# Patient Record
Sex: FEMALE | Race: ASIAN | NOT HISPANIC OR LATINO
[De-identification: names, ages, dates, MRNs, and addresses within clinical notes are randomized per-mention and may not be internally consistent; named-entity substitution may affect disease eponyms.]

---

## 2018-07-18 ENCOUNTER — APPOINTMENT (OUTPATIENT)
Dept: OBGYN | Facility: CLINIC | Age: 38
End: 2018-07-18
Payer: COMMERCIAL

## 2018-07-18 VITALS
HEIGHT: 70 IN | DIASTOLIC BLOOD PRESSURE: 77 MMHG | WEIGHT: 212 LBS | BODY MASS INDEX: 30.35 KG/M2 | SYSTOLIC BLOOD PRESSURE: 109 MMHG

## 2018-07-18 PROCEDURE — 99395 PREV VISIT EST AGE 18-39: CPT

## 2018-07-20 LAB — HPV HIGH+LOW RISK DNA PNL CVX: NOT DETECTED

## 2018-07-28 LAB — CYTOLOGY CVX/VAG DOC THIN PREP: NORMAL

## 2019-03-15 ENCOUNTER — APPOINTMENT (OUTPATIENT)
Dept: OTOLARYNGOLOGY | Facility: CLINIC | Age: 39
End: 2019-03-15
Payer: COMMERCIAL

## 2019-03-15 VITALS
SYSTOLIC BLOOD PRESSURE: 122 MMHG | BODY MASS INDEX: 30.35 KG/M2 | DIASTOLIC BLOOD PRESSURE: 78 MMHG | HEART RATE: 76 BPM | HEIGHT: 70 IN | WEIGHT: 212 LBS

## 2019-03-15 DIAGNOSIS — R68.89 OTHER GENERAL SYMPTOMS AND SIGNS: ICD-10-CM

## 2019-03-15 DIAGNOSIS — F45.8 OTHER SOMATOFORM DISORDERS: ICD-10-CM

## 2019-03-15 DIAGNOSIS — K21.9 GASTRO-ESOPHAGEAL REFLUX DISEASE W/OUT ESOPHAGITIS: ICD-10-CM

## 2019-03-15 PROCEDURE — 99244 OFF/OP CNSLTJ NEW/EST MOD 40: CPT | Mod: 25

## 2019-03-15 PROCEDURE — 31575 DIAGNOSTIC LARYNGOSCOPY: CPT

## 2019-03-15 RX ORDER — FLUTICASONE FUROATE 27.5 UG/1
27.5 SPRAY, METERED NASAL
Refills: 0 | Status: ACTIVE | COMMUNITY

## 2019-03-15 NOTE — PROCEDURE
[de-identified] : Afrin 0.05% and lidocaine 4% sprayed into both nasal passages. Flexible scope #2 used. Passed through nasal passage and nasopharynx/oropharynx/hypopharynx clear. Supraglottis normal. Glottis with fully mobile vocal cords without lesions or masses. Visualized subglottis clear. Postcricoid area without erythema or edema. No pooling of secretions.

## 2019-03-15 NOTE — ASSESSMENT
[FreeTextEntry1] : throat clearing, resolved:\par - suspect was LPRD\par - given reflux precautions handout\par - return for re-eval if symptoms recur

## 2019-03-15 NOTE — CONSULT LETTER
[Dear  ___] : Dear  [unfilled], [Courtesy Letter:] : I had the pleasure of seeing your patient, [unfilled], in my office today. [Please see my note below.] : Please see my note below. [Consult Closing:] : Thank you very much for allowing me to participate in the care of this patient.  If you have any questions, please do not hesitate to contact me. [Sincerely,] : Sincerely, [FreeTextEntry3] : Cindi Gamez MD\par Otolaryngology and Cranial Base Surgery\par Attending Physician - Department of Otolaryngology and Head & Neck Surgery\par [facility]\par  - [employer] [facility]\par Office: [phone]\par Fax: [phone]\par

## 2019-03-15 NOTE — HISTORY OF PRESENT ILLNESS
[de-identified] : 39 y/o F, 5 months ago had intermittent throat clearing with raspy voice and globus sensation.  Symptoms were worse in the morning.  No trouble eating or drinking.  No SOB.  Tried Pepcid for one week without improvement.  Notes symptoms resolved on their own 2 months. ago.

## 2020-04-25 ENCOUNTER — MESSAGE (OUTPATIENT)
Age: 40
End: 2020-04-25

## 2020-05-07 ENCOUNTER — APPOINTMENT (OUTPATIENT)
Dept: DISASTER EMERGENCY | Facility: CLINIC | Age: 40
End: 2020-05-07

## 2020-05-07 LAB
SARS-COV-2 IGG SERPL IA-ACNC: 0.3 INDEX
SARS-COV-2 IGG SERPL QL IA: NEGATIVE

## 2020-06-03 ENCOUNTER — APPOINTMENT (OUTPATIENT)
Dept: OTOLARYNGOLOGY | Facility: CLINIC | Age: 40
End: 2020-06-03

## 2020-12-24 ENCOUNTER — LABORATORY RESULT (OUTPATIENT)
Age: 40
End: 2020-12-24

## 2020-12-24 DIAGNOSIS — Z00.00 ENCOUNTER FOR GENERAL ADULT MEDICAL EXAMINATION W/OUT ABNORMAL FINDINGS: ICD-10-CM

## 2021-02-10 DIAGNOSIS — L30.9 DERMATITIS, UNSPECIFIED: ICD-10-CM

## 2021-02-10 RX ORDER — TRIAMCINOLONE ACETONIDE 1 MG/G
0.1 OINTMENT TOPICAL TWICE DAILY
Qty: 1 | Refills: 0 | Status: ACTIVE | COMMUNITY
Start: 2021-02-10 | End: 1900-01-01

## 2021-02-24 ENCOUNTER — APPOINTMENT (OUTPATIENT)
Dept: HEMATOLOGY ONCOLOGY | Facility: CLINIC | Age: 41
End: 2021-02-24

## 2021-02-25 ENCOUNTER — OUTPATIENT (OUTPATIENT)
Dept: OUTPATIENT SERVICES | Facility: HOSPITAL | Age: 41
LOS: 1 days | Discharge: HOME | End: 2021-02-25

## 2021-02-25 ENCOUNTER — APPOINTMENT (OUTPATIENT)
Dept: HEMATOLOGY ONCOLOGY | Facility: CLINIC | Age: 41
End: 2021-02-25

## 2021-02-25 ENCOUNTER — LABORATORY RESULT (OUTPATIENT)
Age: 41
End: 2021-02-25

## 2021-02-25 DIAGNOSIS — U07.1 COVID-19: ICD-10-CM

## 2021-02-26 ENCOUNTER — APPOINTMENT (OUTPATIENT)
Dept: HEMATOLOGY ONCOLOGY | Facility: CLINIC | Age: 41
End: 2021-02-26

## 2022-02-11 ENCOUNTER — APPOINTMENT (OUTPATIENT)
Dept: MAMMOGRAPHY | Facility: IMAGING CENTER | Age: 42
End: 2022-02-11
Payer: COMMERCIAL

## 2022-02-11 ENCOUNTER — APPOINTMENT (OUTPATIENT)
Dept: ULTRASOUND IMAGING | Facility: IMAGING CENTER | Age: 42
End: 2022-02-11
Payer: COMMERCIAL

## 2022-02-11 ENCOUNTER — OUTPATIENT (OUTPATIENT)
Dept: OUTPATIENT SERVICES | Facility: HOSPITAL | Age: 42
LOS: 1 days | End: 2022-02-11
Payer: COMMERCIAL

## 2022-02-11 DIAGNOSIS — Z00.8 ENCOUNTER FOR OTHER GENERAL EXAMINATION: ICD-10-CM

## 2022-02-11 PROCEDURE — 77063 BREAST TOMOSYNTHESIS BI: CPT

## 2022-02-11 PROCEDURE — 77067 SCR MAMMO BI INCL CAD: CPT | Mod: 26

## 2022-02-11 PROCEDURE — 77067 SCR MAMMO BI INCL CAD: CPT

## 2022-02-11 PROCEDURE — 77063 BREAST TOMOSYNTHESIS BI: CPT | Mod: 26

## 2022-02-11 PROCEDURE — 76641 ULTRASOUND BREAST COMPLETE: CPT | Mod: 26,50

## 2022-02-11 PROCEDURE — 76641 ULTRASOUND BREAST COMPLETE: CPT

## 2023-05-08 ENCOUNTER — APPOINTMENT (OUTPATIENT)
Dept: OBGYN | Facility: CLINIC | Age: 43
End: 2023-05-08
Payer: COMMERCIAL

## 2023-05-08 VITALS
HEIGHT: 70 IN | HEART RATE: 80 BPM | WEIGHT: 231.6 LBS | SYSTOLIC BLOOD PRESSURE: 131 MMHG | BODY MASS INDEX: 33.16 KG/M2 | DIASTOLIC BLOOD PRESSURE: 84 MMHG

## 2023-05-08 PROCEDURE — 99386 PREV VISIT NEW AGE 40-64: CPT

## 2023-05-08 NOTE — PHYSICAL EXAM
[Chaperone Present] : A chaperone was present in the examining room during all aspects of the physical examination [FreeTextEntry1] : DANIEL Almendarez [Appropriately responsive] : appropriately responsive [Alert] : alert [No Acute Distress] : no acute distress [No Lymphadenopathy] : no lymphadenopathy [Regular Rate Rhythm] : regular rate rhythm [No Murmurs] : no murmurs [Clear to Auscultation B/L] : clear to auscultation bilaterally [Soft] : soft [Non-tender] : non-tender [Non-distended] : non-distended [No HSM] : No HSM [No Lesions] : no lesions [No Mass] : no mass [Oriented x3] : oriented x3 [Examination Of The Breasts] : a normal appearance [Breast Palpation Diffuse Fibrous Tissue Bilateral] : fibrocystic changes [No Masses] : no breast masses were palpable [Labia Minora] : normal [Labia Majora] : normal [Scant] : There was scant vaginal bleeding [Normal] : normal [Tenderness] : nontender [Enlarged ___ wks] : not enlarged [Uterine Adnexae] : normal

## 2023-05-08 NOTE — PLAN
[FreeTextEntry1] : 43 y/o presents for annual visit. \par \par #HM\par -Pap with HPV today\par -Mammo scheduled\par \par #Heavy menstrual bleeding\par -Requisition for pelvic sono provided \par \par winsome HAM\par

## 2023-05-17 LAB
CYTOLOGY CVX/VAG DOC THIN PREP: ABNORMAL
HPV HIGH+LOW RISK DNA PNL CVX: DETECTED

## 2023-05-19 ENCOUNTER — TRANSCRIPTION ENCOUNTER (OUTPATIENT)
Age: 43
End: 2023-05-19

## 2023-05-19 ENCOUNTER — APPOINTMENT (OUTPATIENT)
Dept: OBGYN | Facility: CLINIC | Age: 43
End: 2023-05-19
Payer: COMMERCIAL

## 2023-05-19 VITALS
WEIGHT: 225 LBS | SYSTOLIC BLOOD PRESSURE: 123 MMHG | HEART RATE: 72 BPM | DIASTOLIC BLOOD PRESSURE: 83 MMHG | HEIGHT: 70 IN | BODY MASS INDEX: 32.21 KG/M2

## 2023-05-19 DIAGNOSIS — R87.615 UNSATISFACTORY CYTOLOGIC SMEAR OF CERVIX: ICD-10-CM

## 2023-05-19 DIAGNOSIS — Z01.419 ENCOUNTER FOR GYNECOLOGICAL EXAMINATION (GENERAL) (ROUTINE) W/OUT ABNORMAL FINDINGS: ICD-10-CM

## 2023-05-19 PROCEDURE — 99213 OFFICE O/P EST LOW 20 MIN: CPT

## 2023-05-19 NOTE — PLAN
[FreeTextEntry1] : 41y/o presents for repeat pap smear\par \par #HM\par -Pap smear performed today\par -Rx Provera sent to delay period until after vacation\par \par winsome HAM\par

## 2023-05-19 NOTE — PHYSICAL EXAM
[Chaperone Present] : A chaperone was present in the examining room during all aspects of the physical examination [FreeTextEntry1] : DANIEL Young [Appropriately responsive] : appropriately responsive [Alert] : alert [No Acute Distress] : no acute distress [No Lymphadenopathy] : no lymphadenopathy [Regular Rate Rhythm] : regular rate rhythm [No Murmurs] : no murmurs [Clear to Auscultation B/L] : clear to auscultation bilaterally [Soft] : soft [Non-tender] : non-tender [Non-distended] : non-distended [No HSM] : No HSM [No Lesions] : no lesions [No Mass] : no mass [Oriented x3] : oriented x3 [Labia Majora] : normal [Labia Minora] : normal [No Bleeding] : There was no active vaginal bleeding [Normal] : normal [Uterine Adnexae] : normal

## 2023-05-21 LAB — HPV HIGH+LOW RISK DNA PNL CVX: NOT DETECTED

## 2023-06-02 LAB — CYTOLOGY CVX/VAG DOC THIN PREP: NORMAL

## 2023-06-14 ENCOUNTER — APPOINTMENT (OUTPATIENT)
Dept: ULTRASOUND IMAGING | Facility: IMAGING CENTER | Age: 43
End: 2023-06-14
Payer: COMMERCIAL

## 2023-06-14 ENCOUNTER — APPOINTMENT (OUTPATIENT)
Dept: MAMMOGRAPHY | Facility: IMAGING CENTER | Age: 43
End: 2023-06-14
Payer: COMMERCIAL

## 2023-06-14 ENCOUNTER — OUTPATIENT (OUTPATIENT)
Dept: OUTPATIENT SERVICES | Facility: HOSPITAL | Age: 43
LOS: 1 days | End: 2023-06-14
Payer: COMMERCIAL

## 2023-06-14 DIAGNOSIS — Z01.419 ENCOUNTER FOR GYNECOLOGICAL EXAMINATION (GENERAL) (ROUTINE) WITHOUT ABNORMAL FINDINGS: ICD-10-CM

## 2023-06-14 PROCEDURE — 76830 TRANSVAGINAL US NON-OB: CPT | Mod: 26

## 2023-06-14 PROCEDURE — 77067 SCR MAMMO BI INCL CAD: CPT | Mod: 26

## 2023-06-14 PROCEDURE — 76830 TRANSVAGINAL US NON-OB: CPT

## 2023-06-14 PROCEDURE — 76856 US EXAM PELVIC COMPLETE: CPT

## 2023-06-14 PROCEDURE — 76856 US EXAM PELVIC COMPLETE: CPT | Mod: 26,59

## 2023-06-14 PROCEDURE — 77067 SCR MAMMO BI INCL CAD: CPT

## 2023-06-14 PROCEDURE — 77063 BREAST TOMOSYNTHESIS BI: CPT | Mod: 26

## 2023-06-14 PROCEDURE — 76856 US EXAM PELVIC COMPLETE: CPT | Mod: 26

## 2023-06-14 PROCEDURE — 77063 BREAST TOMOSYNTHESIS BI: CPT

## 2023-06-28 ENCOUNTER — APPOINTMENT (OUTPATIENT)
Dept: OBGYN | Facility: CLINIC | Age: 43
End: 2023-06-28
Payer: COMMERCIAL

## 2023-06-28 DIAGNOSIS — D25.9 LEIOMYOMA OF UTERUS, UNSPECIFIED: ICD-10-CM

## 2023-06-28 PROCEDURE — 99213 OFFICE O/P EST LOW 20 MIN: CPT | Mod: 95

## 2023-06-28 NOTE — PLAN
[FreeTextEntry1] : 43y/o presents for followup regarding heavy menstrual bleeding. Pt with myomatous, enlarged uterus on sonogram.\par \par #Heavy menstrual bleeding\par -2/2 to fibroids\par -Bleeding continues to get heavier, now with clot passage. Pt desires treatment\par -Discussed treatment options: medical tx with progesterone (oral vs Mirena IUD), UAE, surgical tx with hysterectomy\par -Pt desires trial of medical management prior to considering definitive treatment with surgical management. Pt desires Mirena IUD placement\par -Billing team tasked for authorization\par -Will plan for placement under ultrasound guidance due to myomatous uterus \par \par RTO for IUD placement\par winsome HAM

## 2023-07-12 ENCOUNTER — TRANSCRIPTION ENCOUNTER (OUTPATIENT)
Age: 43
End: 2023-07-12

## 2023-07-12 RX ORDER — MEDROXYPROGESTERONE ACETATE 10 MG/1
10 TABLET ORAL DAILY
Qty: 30 | Refills: 1 | Status: ACTIVE | COMMUNITY
Start: 2023-05-19 | End: 1900-01-01

## 2023-08-15 ENCOUNTER — APPOINTMENT (OUTPATIENT)
Dept: OBGYN | Facility: CLINIC | Age: 43
End: 2023-08-15

## 2024-10-25 ENCOUNTER — OUTPATIENT (OUTPATIENT)
Dept: OUTPATIENT SERVICES | Facility: HOSPITAL | Age: 44
LOS: 1 days | End: 2024-10-25
Payer: COMMERCIAL

## 2024-10-25 VITALS
HEART RATE: 84 BPM | OXYGEN SATURATION: 98 % | DIASTOLIC BLOOD PRESSURE: 88 MMHG | SYSTOLIC BLOOD PRESSURE: 128 MMHG | TEMPERATURE: 99 F | RESPIRATION RATE: 18 BRPM | HEIGHT: 70 IN | WEIGHT: 220.02 LBS

## 2024-10-25 DIAGNOSIS — D25.9 LEIOMYOMA OF UTERUS, UNSPECIFIED: ICD-10-CM

## 2024-10-25 LAB
ANION GAP SERPL CALC-SCNC: 14 MMOL/L — SIGNIFICANT CHANGE UP (ref 5–17)
BLD GP AB SCN SERPL QL: NEGATIVE — SIGNIFICANT CHANGE UP
BUN SERPL-MCNC: 11 MG/DL — SIGNIFICANT CHANGE UP (ref 7–23)
CALCIUM SERPL-MCNC: 9.5 MG/DL — SIGNIFICANT CHANGE UP (ref 8.4–10.5)
CHLORIDE SERPL-SCNC: 101 MMOL/L — SIGNIFICANT CHANGE UP (ref 96–108)
CO2 SERPL-SCNC: 22 MMOL/L — SIGNIFICANT CHANGE UP (ref 22–31)
CREAT SERPL-MCNC: 0.65 MG/DL — SIGNIFICANT CHANGE UP (ref 0.5–1.3)
EGFR: 111 ML/MIN/1.73M2 — SIGNIFICANT CHANGE UP
GLUCOSE SERPL-MCNC: 87 MG/DL — SIGNIFICANT CHANGE UP (ref 70–99)
HCT VFR BLD CALC: 30.2 % — LOW (ref 34.5–45)
HGB BLD-MCNC: 8.7 G/DL — LOW (ref 11.5–15.5)
MCHC RBC-ENTMCNC: 20 PG — LOW (ref 27–34)
MCHC RBC-ENTMCNC: 28.8 GM/DL — LOW (ref 32–36)
MCV RBC AUTO: 69.4 FL — LOW (ref 80–100)
NRBC # BLD: 0 /100 WBCS — SIGNIFICANT CHANGE UP (ref 0–0)
PLATELET # BLD AUTO: 480 K/UL — HIGH (ref 150–400)
POTASSIUM SERPL-MCNC: 4.2 MMOL/L — SIGNIFICANT CHANGE UP (ref 3.5–5.3)
POTASSIUM SERPL-SCNC: 4.2 MMOL/L — SIGNIFICANT CHANGE UP (ref 3.5–5.3)
RBC # BLD: 4.35 M/UL — SIGNIFICANT CHANGE UP (ref 3.8–5.2)
RBC # FLD: 16.7 % — HIGH (ref 10.3–14.5)
RH IG SCN BLD-IMP: POSITIVE — SIGNIFICANT CHANGE UP
SODIUM SERPL-SCNC: 137 MMOL/L — SIGNIFICANT CHANGE UP (ref 135–145)
WBC # BLD: 8.83 K/UL — SIGNIFICANT CHANGE UP (ref 3.8–10.5)
WBC # FLD AUTO: 8.83 K/UL — SIGNIFICANT CHANGE UP (ref 3.8–10.5)

## 2024-10-25 PROCEDURE — G0463: CPT

## 2024-10-25 PROCEDURE — 85027 COMPLETE CBC AUTOMATED: CPT

## 2024-10-25 PROCEDURE — 86901 BLOOD TYPING SEROLOGIC RH(D): CPT

## 2024-10-25 PROCEDURE — 80048 BASIC METABOLIC PNL TOTAL CA: CPT

## 2024-10-25 PROCEDURE — 86900 BLOOD TYPING SEROLOGIC ABO: CPT

## 2024-10-25 PROCEDURE — 86850 RBC ANTIBODY SCREEN: CPT

## 2024-10-25 NOTE — H&P PST ADULT - HISTORY OF PRESENT ILLNESS
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 No significant PMH. Denies recent fevers, chills, cough, chest pain or SOB and feels well overall.

## 2024-10-25 NOTE — H&P PST ADULT - TEMPERATURE IN FAHRENHEIT (DEGREES F)
You can access the FollowMyHealth Patient Portal offered by [facility] by registering at the following website: [email or web address]. By joining [facility]’s FollowMyHealth portal, you will also be able to view your health information using other applications (apps) compatible with our system.
98.7

## 2024-10-25 NOTE — H&P PST ADULT - PROBLEM SELECTOR PLAN 1
Scheduled for sx on 11/1/2024. Surgical instructions reviewed and provided to pt. On Zepbound, last dose 10/18/2024. Asymptomatic, denies any GI symptoms.

## 2024-10-25 NOTE — H&P PST ADULT - NSICDXPASTMEDICALHX_GEN_ALL_CORE_FT
PAST MEDICAL HISTORY:  Abnormal uterine and vaginal bleeding, unspecified     Leiomyoma of uterus, unspecified      PAST MEDICAL HISTORY:  Abnormal uterine and vaginal bleeding, unspecified     BMI 31.0-31.9,adult     Leiomyoma of uterus, unspecified

## 2024-10-25 NOTE — H&P PST ADULT - ATTENDING COMMENTS
Gyn Attg Note:   Pt was counseled today at the preop area, re the scheduled gyn operation: D+C, Operative Hysteroscopy, Resection of submucosal fibroid or any other intrauterine lesion, Placement of Mirena IUD.   Discussed and reviewed indication for surgery, possible risks and operative complications, including but not limited to postop infection, excessive bleeding, blood transfusion, injuries to the gyn organs or the surrounding organs and tissues such as the urologic organs or the GI tract.   Postop care, recovery, return to nl activities, pain mgmt also discussed.  Possible side effects of hormonal IUD also explained.   All q's answered.  Consent signed by pt and witnessed.

## 2024-11-01 ENCOUNTER — TRANSCRIPTION ENCOUNTER (OUTPATIENT)
Age: 44
End: 2024-11-01

## 2024-11-01 ENCOUNTER — OUTPATIENT (OUTPATIENT)
Dept: OUTPATIENT SERVICES | Facility: HOSPITAL | Age: 44
LOS: 1 days | End: 2024-11-01
Payer: COMMERCIAL

## 2024-11-01 VITALS
OXYGEN SATURATION: 99 % | SYSTOLIC BLOOD PRESSURE: 132 MMHG | DIASTOLIC BLOOD PRESSURE: 83 MMHG | TEMPERATURE: 98 F | HEART RATE: 87 BPM | RESPIRATION RATE: 17 BRPM

## 2024-11-01 VITALS
SYSTOLIC BLOOD PRESSURE: 110 MMHG | OXYGEN SATURATION: 98 % | HEART RATE: 76 BPM | HEIGHT: 70 IN | DIASTOLIC BLOOD PRESSURE: 76 MMHG | WEIGHT: 220.02 LBS | RESPIRATION RATE: 16 BRPM | TEMPERATURE: 97 F

## 2024-11-01 DIAGNOSIS — D25.9 LEIOMYOMA OF UTERUS, UNSPECIFIED: ICD-10-CM

## 2024-11-01 PROCEDURE — 58300 INSERT INTRAUTERINE DEVICE: CPT

## 2024-11-01 PROCEDURE — 86900 BLOOD TYPING SEROLOGIC ABO: CPT

## 2024-11-01 PROCEDURE — 86850 RBC ANTIBODY SCREEN: CPT

## 2024-11-01 PROCEDURE — 88305 TISSUE EXAM BY PATHOLOGIST: CPT

## 2024-11-01 PROCEDURE — C1782: CPT

## 2024-11-01 PROCEDURE — 58558 HYSTEROSCOPY BIOPSY: CPT

## 2024-11-01 PROCEDURE — 86901 BLOOD TYPING SEROLOGIC RH(D): CPT

## 2024-11-01 PROCEDURE — 88305 TISSUE EXAM BY PATHOLOGIST: CPT | Mod: 26

## 2024-11-01 DEVICE — AVETA WAVE PLUS RESECTING DEVICE 3.9MM: Type: IMPLANTABLE DEVICE | Status: FUNCTIONAL

## 2024-11-01 DEVICE — IUD MIRENA: Type: IMPLANTABLE DEVICE | Status: FUNCTIONAL

## 2024-11-01 RX ORDER — B-COMPLEX WITH VITAMIN C
1 VIAL (ML) INJECTION
Refills: 0 | DISCHARGE

## 2024-11-01 RX ORDER — FERROUS SULFATE 325(65) MG
1 TABLET ORAL
Refills: 0 | DISCHARGE

## 2024-11-01 RX ORDER — ACETAMINOPHEN 500 MG
2 TABLET ORAL
Refills: 0 | DISCHARGE

## 2024-11-01 RX ORDER — OXYCODONE HYDROCHLORIDE 30 MG/1
5 TABLET ORAL ONCE
Refills: 0 | Status: DISCONTINUED | OUTPATIENT
Start: 2024-11-01 | End: 2024-11-01

## 2024-11-01 RX ORDER — ONDANSETRON HYDROCHLORIDE 2 MG/ML
4 INJECTION, SOLUTION INTRAMUSCULAR; INTRAVENOUS ONCE
Refills: 0 | Status: ACTIVE | OUTPATIENT
Start: 2024-11-01 | End: 2025-09-30

## 2024-11-01 RX ORDER — LIDOCAINE HCL 60 MG/3 ML
0.2 SYRINGE (ML) INJECTION ONCE
Refills: 0 | Status: ACTIVE | OUTPATIENT
Start: 2024-11-01

## 2024-11-01 RX ORDER — FENTANYL CITRAT/DEXTROSE 5%/PF 1250MCG/50
25 PATIENT CONTROLLED ANALGESIA SYRINGE INTRAVENOUS
Refills: 0 | Status: DISCONTINUED | OUTPATIENT
Start: 2024-11-01 | End: 2024-11-01

## 2024-11-01 RX ORDER — IBUPROFEN 200 MG
4 TABLET ORAL
Refills: 0 | DISCHARGE

## 2024-11-01 RX ADMIN — Medication 100 MILLILITER(S): at 11:00

## 2024-11-01 NOTE — ASU DISCHARGE PLAN (ADULT/PEDIATRIC) - FINANCIAL ASSISTANCE
[facility] provides services at a reduced cost to those who are determined to be eligible through [facility]’s financial assistance program. Information regarding [facility]’s financial assistance program can be found by going to [email or web address] or by calling [phone].

## 2024-11-01 NOTE — ASU DISCHARGE PLAN (ADULT/PEDIATRIC) - NS MD DC FALL RISK RISK
For information on Fall & Injury Prevention, visit: [email or web address] OR  [email or web address] OR  https://www.cdc.gov/steadi/patient.html

## 2024-11-01 NOTE — BRIEF OPERATIVE NOTE - OPERATION/FINDINGS
multi-nodular fibroid uterus.  Overall size of uterus 14-16 wk size.  uterine cavity containing a 4-5 cm submucosal myoma arising from the posterior / L lateral aspect.  Both Fallopian tube ostia were normal.  cervix and vaginal canal and vulva: normal.   no adnexal / pelvic mass palpable.

## 2024-11-01 NOTE — BRIEF OPERATIVE NOTE - NSICDXBRIEFPREOP_GEN_ALL_CORE_FT
PRE-OP DIAGNOSIS:  Fibroid uterus 01-Nov-2024 13:48:08  Biju Arredondo  Abnormal uterine bleeding (AUB) 01-Nov-2024 13:48:14  Biju Arredondo  Anemia due to chronic blood loss 01-Nov-2024 13:48:25  Biju Arredondo

## 2024-11-01 NOTE — ASU PREOP CHECKLIST - AICD PRESENT
Patient called anesthesia office complaining of SOB, productive cough, fatigue and CAMARGO. Discussed that these symptoms are not related to anesthesia administered almost 4 weeks ago. Urged MsSarai Magdy Rothmi to seek medical care today or call 911 if symptoms preclude her from self-travel.     Dr. Hanh Pickard no

## 2024-11-01 NOTE — ASU DISCHARGE PLAN (ADULT/PEDIATRIC) - NURSING INSTRUCTIONS
Next dose of tylenol at/after_7pm___. Do not exceed 4000mg in a 24hour  period. Every 6hours as needed. Next dose of motrin at/after 7pm

## 2024-11-01 NOTE — BRIEF OPERATIVE NOTE - NSICDXBRIEFPROCEDURE_GEN_ALL_CORE_FT
PROCEDURES:  D and C of uterus 01-Nov-2024 13:47:33  Biju Arredondo  Dilation and curettage of uterus with operative hysteroscopy and surgical removal of submucous leiomyoma 01-Nov-2024 13:47:45  Biju Arredondo  IUD placement 01-Nov-2024 13:47:54  Biju Arredondo

## 2024-11-01 NOTE — ASU DISCHARGE PLAN (ADULT/PEDIATRIC) - CARE PROVIDER_API CALL
Biju Arredondo  Obstetrics and Gynecology  [address], [address]  [place] [address]  Phone: [phone]  Fax: [phone]  Follow Up Time:

## 2024-11-01 NOTE — ASU PATIENT PROFILE, ADULT - NSICDXPASTMEDICALHX_GEN_ALL_CORE_FT
PAST MEDICAL HISTORY:  Abnormal uterine and vaginal bleeding, unspecified     BMI 31.0-31.9,adult     Leiomyoma of uterus, unspecified

## 2024-11-01 NOTE — BRIEF OPERATIVE NOTE - NSICDXBRIEFPOSTOP_GEN_ALL_CORE_FT
POST-OP DIAGNOSIS:  Fibroid uterus 01-Nov-2024 13:48:38  Biju Arredondo  Abnormal uterine bleeding (AUB) 01-Nov-2024 13:48:43  Biju Arredondo  Chronic blood loss anemia 01-Nov-2024 13:48:54  Biju Arredondo

## 2024-11-12 LAB — SURGICAL PATHOLOGY STUDY: SIGNIFICANT CHANGE UP

## 2025-01-29 PROBLEM — N93.9 ABNORMAL UTERINE AND VAGINAL BLEEDING, UNSPECIFIED: Chronic | Status: ACTIVE | Noted: 2024-10-25

## 2025-01-29 PROBLEM — D25.9 LEIOMYOMA OF UTERUS, UNSPECIFIED: Chronic | Status: ACTIVE | Noted: 2024-10-25

## 2025-03-12 ENCOUNTER — APPOINTMENT (OUTPATIENT)
Dept: OBGYN | Facility: CLINIC | Age: 45
End: 2025-03-12
Payer: COMMERCIAL

## 2025-03-12 VITALS — SYSTOLIC BLOOD PRESSURE: 102 MMHG | DIASTOLIC BLOOD PRESSURE: 84 MMHG

## 2025-03-12 VITALS
BODY MASS INDEX: 31.5 KG/M2 | WEIGHT: 220 LBS | HEIGHT: 70 IN | SYSTOLIC BLOOD PRESSURE: 153 MMHG | DIASTOLIC BLOOD PRESSURE: 110 MMHG

## 2025-03-12 PROCEDURE — 99214 OFFICE O/P EST MOD 30 MIN: CPT

## 2025-03-21 ENCOUNTER — APPOINTMENT (OUTPATIENT)
Dept: GYNECOLOGIC ONCOLOGY | Facility: CLINIC | Age: 45
End: 2025-03-21
Payer: COMMERCIAL

## 2025-03-21 ENCOUNTER — NON-APPOINTMENT (OUTPATIENT)
Age: 45
End: 2025-03-21

## 2025-03-21 VITALS
OXYGEN SATURATION: 98 % | WEIGHT: 219 LBS | BODY MASS INDEX: 31.35 KG/M2 | TEMPERATURE: 97.2 F | HEART RATE: 77 BPM | DIASTOLIC BLOOD PRESSURE: 97 MMHG | HEIGHT: 70 IN | SYSTOLIC BLOOD PRESSURE: 149 MMHG

## 2025-03-21 DIAGNOSIS — R10.2 PELVIC AND PERINEAL PAIN: ICD-10-CM

## 2025-03-21 DIAGNOSIS — N85.2 HYPERTROPHY OF UTERUS: ICD-10-CM

## 2025-03-21 DIAGNOSIS — D25.9 LEIOMYOMA OF UTERUS, UNSPECIFIED: ICD-10-CM

## 2025-03-21 DIAGNOSIS — K42.9 UMBILICAL HERNIA W/OUT OBSTRUCTION OR GANGRENE: ICD-10-CM

## 2025-03-21 PROCEDURE — 99459 PELVIC EXAMINATION: CPT

## 2025-03-21 PROCEDURE — 99205 OFFICE O/P NEW HI 60 MIN: CPT

## 2025-03-21 RX ORDER — TIRZEPATIDE 12.5 MG/.5ML
12.5 INJECTION, SOLUTION SUBCUTANEOUS
Refills: 0 | Status: ACTIVE | COMMUNITY

## 2025-03-21 RX ORDER — FERROUS GLUCONATE 256(28)MG
TABLET ORAL
Refills: 0 | Status: ACTIVE | COMMUNITY

## 2025-03-26 ENCOUNTER — OUTPATIENT (OUTPATIENT)
Dept: OUTPATIENT SERVICES | Facility: HOSPITAL | Age: 45
LOS: 1 days | End: 2025-03-26
Payer: COMMERCIAL

## 2025-03-26 ENCOUNTER — APPOINTMENT (OUTPATIENT)
Dept: MRI IMAGING | Facility: CLINIC | Age: 45
End: 2025-03-26

## 2025-03-26 ENCOUNTER — APPOINTMENT (OUTPATIENT)
Dept: MRI IMAGING | Facility: HOSPITAL | Age: 45
End: 2025-03-26
Payer: COMMERCIAL

## 2025-03-26 DIAGNOSIS — R10.2 PELVIC AND PERINEAL PAIN: ICD-10-CM

## 2025-03-26 DIAGNOSIS — D25.9 LEIOMYOMA OF UTERUS, UNSPECIFIED: ICD-10-CM

## 2025-03-26 PROCEDURE — 74183 MRI ABD W/O CNTR FLWD CNTR: CPT | Mod: 26

## 2025-03-26 PROCEDURE — 74183 MRI ABD W/O CNTR FLWD CNTR: CPT

## 2025-03-26 PROCEDURE — 72197 MRI PELVIS W/O & W/DYE: CPT

## 2025-03-26 PROCEDURE — A9579: CPT

## 2025-03-26 PROCEDURE — 72197 MRI PELVIS W/O & W/DYE: CPT | Mod: 26

## 2025-03-31 ENCOUNTER — APPOINTMENT (OUTPATIENT)
Dept: GYNECOLOGIC ONCOLOGY | Facility: CLINIC | Age: 45
End: 2025-03-31
Payer: COMMERCIAL

## 2025-03-31 DIAGNOSIS — D25.9 LEIOMYOMA OF UTERUS, UNSPECIFIED: ICD-10-CM

## 2025-03-31 DIAGNOSIS — N85.2 HYPERTROPHY OF UTERUS: ICD-10-CM

## 2025-03-31 PROCEDURE — 99213 OFFICE O/P EST LOW 20 MIN: CPT | Mod: 95

## 2025-04-02 ENCOUNTER — TRANSCRIPTION ENCOUNTER (OUTPATIENT)
Age: 45
End: 2025-04-02

## 2025-04-04 ENCOUNTER — APPOINTMENT (OUTPATIENT)
Dept: PLASTIC SURGERY | Facility: CLINIC | Age: 45
End: 2025-04-04
Payer: COMMERCIAL

## 2025-04-04 VITALS
SYSTOLIC BLOOD PRESSURE: 158 MMHG | BODY MASS INDEX: 31.35 KG/M2 | TEMPERATURE: 97.9 F | HEIGHT: 70 IN | HEART RATE: 74 BPM | OXYGEN SATURATION: 99 % | DIASTOLIC BLOOD PRESSURE: 99 MMHG | WEIGHT: 219 LBS

## 2025-04-04 PROCEDURE — 99204 OFFICE O/P NEW MOD 45 MIN: CPT

## 2025-04-10 ENCOUNTER — OUTPATIENT (OUTPATIENT)
Dept: OUTPATIENT SERVICES | Facility: HOSPITAL | Age: 45
LOS: 1 days | End: 2025-04-10

## 2025-04-10 VITALS
DIASTOLIC BLOOD PRESSURE: 94 MMHG | OXYGEN SATURATION: 99 % | HEART RATE: 75 BPM | WEIGHT: 214.95 LBS | HEIGHT: 68.75 IN | RESPIRATION RATE: 18 BRPM | TEMPERATURE: 97 F | SYSTOLIC BLOOD PRESSURE: 137 MMHG

## 2025-04-10 DIAGNOSIS — D25.9 LEIOMYOMA OF UTERUS, UNSPECIFIED: ICD-10-CM

## 2025-04-10 DIAGNOSIS — Z98.890 OTHER SPECIFIED POSTPROCEDURAL STATES: Chronic | ICD-10-CM

## 2025-04-10 DIAGNOSIS — I10 ESSENTIAL (PRIMARY) HYPERTENSION: ICD-10-CM

## 2025-04-10 DIAGNOSIS — O02.1 MISSED ABORTION: Chronic | ICD-10-CM

## 2025-04-10 DIAGNOSIS — E65 LOCALIZED ADIPOSITY: ICD-10-CM

## 2025-04-10 DIAGNOSIS — Z98.891 HISTORY OF UTERINE SCAR FROM PREVIOUS SURGERY: Chronic | ICD-10-CM

## 2025-04-10 DIAGNOSIS — Z97.5 PRESENCE OF (INTRAUTERINE) CONTRACEPTIVE DEVICE: Chronic | ICD-10-CM

## 2025-04-10 DIAGNOSIS — N85.2 HYPERTROPHY OF UTERUS: ICD-10-CM

## 2025-04-10 LAB
A1C WITH ESTIMATED AVERAGE GLUCOSE RESULT: 5 % — SIGNIFICANT CHANGE UP (ref 4–5.6)
ANION GAP SERPL CALC-SCNC: 11 MMOL/L — SIGNIFICANT CHANGE UP (ref 7–14)
APPEARANCE UR: CLEAR — SIGNIFICANT CHANGE UP
BACTERIA # UR AUTO: NEGATIVE /HPF — SIGNIFICANT CHANGE UP
BASOPHILS # BLD AUTO: 0.05 K/UL — SIGNIFICANT CHANGE UP (ref 0–0.2)
BASOPHILS NFR BLD AUTO: 0.6 % — SIGNIFICANT CHANGE UP (ref 0–2)
BILIRUB UR-MCNC: NEGATIVE — SIGNIFICANT CHANGE UP
BLD GP AB SCN SERPL QL: NEGATIVE — SIGNIFICANT CHANGE UP
BUN SERPL-MCNC: 15 MG/DL — SIGNIFICANT CHANGE UP (ref 7–23)
CALCIUM SERPL-MCNC: 9.4 MG/DL — SIGNIFICANT CHANGE UP (ref 8.4–10.5)
CAST: 0 /LPF — SIGNIFICANT CHANGE UP (ref 0–4)
CHLORIDE SERPL-SCNC: 103 MMOL/L — SIGNIFICANT CHANGE UP (ref 98–107)
CO2 SERPL-SCNC: 23 MMOL/L — SIGNIFICANT CHANGE UP (ref 22–31)
COLOR SPEC: YELLOW — SIGNIFICANT CHANGE UP
CREAT SERPL-MCNC: 0.68 MG/DL — SIGNIFICANT CHANGE UP (ref 0.5–1.3)
DIFF PNL FLD: ABNORMAL
EGFR: 110 ML/MIN/1.73M2 — SIGNIFICANT CHANGE UP
EGFR: 110 ML/MIN/1.73M2 — SIGNIFICANT CHANGE UP
EOSINOPHIL # BLD AUTO: 0.33 K/UL — SIGNIFICANT CHANGE UP (ref 0–0.5)
EOSINOPHIL NFR BLD AUTO: 4.2 % — SIGNIFICANT CHANGE UP (ref 0–6)
ESTIMATED AVERAGE GLUCOSE: 97 — SIGNIFICANT CHANGE UP
GLUCOSE SERPL-MCNC: 79 MG/DL — SIGNIFICANT CHANGE UP (ref 70–99)
GLUCOSE UR QL: NEGATIVE MG/DL — SIGNIFICANT CHANGE UP
HCT VFR BLD CALC: 41.7 % — SIGNIFICANT CHANGE UP (ref 34.5–45)
HGB BLD-MCNC: 13.9 G/DL — SIGNIFICANT CHANGE UP (ref 11.5–15.5)
IMM GRANULOCYTES NFR BLD AUTO: 0.3 % — SIGNIFICANT CHANGE UP (ref 0–0.9)
KETONES UR-MCNC: NEGATIVE MG/DL — SIGNIFICANT CHANGE UP
LEUKOCYTE ESTERASE UR-ACNC: NEGATIVE — SIGNIFICANT CHANGE UP
LYMPHOCYTES # BLD AUTO: 2.29 K/UL — SIGNIFICANT CHANGE UP (ref 1–3.3)
LYMPHOCYTES # BLD AUTO: 29.3 % — SIGNIFICANT CHANGE UP (ref 13–44)
MCHC RBC-ENTMCNC: 24.2 PG — LOW (ref 27–34)
MCHC RBC-ENTMCNC: 33.3 G/DL — SIGNIFICANT CHANGE UP (ref 32–36)
MCV RBC AUTO: 72.5 FL — LOW (ref 80–100)
MONOCYTES # BLD AUTO: 0.58 K/UL — SIGNIFICANT CHANGE UP (ref 0–0.9)
MONOCYTES NFR BLD AUTO: 7.4 % — SIGNIFICANT CHANGE UP (ref 2–14)
NEUTROPHILS # BLD AUTO: 4.55 K/UL — SIGNIFICANT CHANGE UP (ref 1.8–7.4)
NEUTROPHILS NFR BLD AUTO: 58.2 % — SIGNIFICANT CHANGE UP (ref 43–77)
NITRITE UR-MCNC: NEGATIVE — SIGNIFICANT CHANGE UP
PH UR: 7 — SIGNIFICANT CHANGE UP (ref 5–8)
PLATELET # BLD AUTO: 409 K/UL — HIGH (ref 150–400)
POTASSIUM SERPL-MCNC: 4 MMOL/L — SIGNIFICANT CHANGE UP (ref 3.5–5.3)
POTASSIUM SERPL-SCNC: 4 MMOL/L — SIGNIFICANT CHANGE UP (ref 3.5–5.3)
PROT UR-MCNC: NEGATIVE MG/DL — SIGNIFICANT CHANGE UP
RBC # BLD: 5.75 M/UL — HIGH (ref 3.8–5.2)
RBC # FLD: 16.1 % — HIGH (ref 10.3–14.5)
RBC CASTS # UR COMP ASSIST: 4 /HPF — SIGNIFICANT CHANGE UP (ref 0–4)
RH IG SCN BLD-IMP: POSITIVE — SIGNIFICANT CHANGE UP
RH IG SCN BLD-IMP: POSITIVE — SIGNIFICANT CHANGE UP
SODIUM SERPL-SCNC: 137 MMOL/L — SIGNIFICANT CHANGE UP (ref 135–145)
SP GR SPEC: 1.02 — SIGNIFICANT CHANGE UP (ref 1–1.03)
SQUAMOUS # UR AUTO: 1 /HPF — SIGNIFICANT CHANGE UP (ref 0–5)
UROBILINOGEN FLD QL: 0.2 MG/DL — SIGNIFICANT CHANGE UP (ref 0.2–1)
WBC # BLD: 7.82 K/UL — SIGNIFICANT CHANGE UP (ref 3.8–10.5)
WBC # FLD AUTO: 7.82 K/UL — SIGNIFICANT CHANGE UP (ref 3.8–10.5)
WBC UR QL: 0 /HPF — SIGNIFICANT CHANGE UP (ref 0–5)

## 2025-04-10 RX ORDER — SODIUM CHLORIDE 9 G/1000ML
1000 INJECTION, SOLUTION INTRAVENOUS
Refills: 0 | Status: DISCONTINUED | OUTPATIENT
Start: 2025-04-17 | End: 2025-04-17

## 2025-04-10 NOTE — H&P PST ADULT - PROBLEM SELECTOR PLAN 1
Pt. is scheduled for an  exploratory laparotomy, total abdominal hysterectomy possible YASMINE salpingo oophorectomy staging cysto/u cath, and panniculectomy.  Pt. verbalized understanding of instructions and that Chlorhexidine is for external use.  PST CBC, BMP, HgBA1c, UA, U/CS, T&S, and conf. T&S sent.

## 2025-04-10 NOTE — H&P PST ADULT - NSICDXFAMILYHX_GEN_ALL_CORE_FT
FAMILY HISTORY:  Aunt  Still living? Yes, Estimated age: Age Unknown  FH: stroke, Age at diagnosis: Age Unknown

## 2025-04-10 NOTE — H&P PST ADULT - NSICDXPASTMEDICALHX_GEN_ALL_CORE_FT
PAST MEDICAL HISTORY:  Abnormal uterine and vaginal bleeding, unspecified     BMI 31.0-31.9,adult     Leiomyoma of uterus, unspecified      PAST MEDICAL HISTORY:  Abnormal uterine and vaginal bleeding, unspecified     Anxiety     BMI 31.0-31.9,adult     HTN (hypertension)     Hypertrophy of uterus     Leiomyoma of uterus, unspecified     Localized adiposity     Obese      PAST MEDICAL HISTORY:  Abnormal uterine and vaginal bleeding, unspecified     Anxiety     BMI 31.0-31.9,adult     HTN (hypertension)     Hypertrophy of uterus     Iron deficiency anemia     Leiomyoma of uterus, unspecified     Localized adiposity     Obese

## 2025-04-10 NOTE — H&P PST ADULT - ENMT COMMENTS
denies loose teeth Mallampati III on phonation Mallampati III on phonation, "lipoma" on left side of tongue, small

## 2025-04-10 NOTE — H&P PST ADULT - NSICDXPASTSURGICALHX_GEN_ALL_CORE_FT
PAST SURGICAL HISTORY:  H/O myomectomy     History of 2  sections     Missed      Presence of IUD

## 2025-04-10 NOTE — H&P PST ADULT - ATTENDING COMMENTS
Seen in ASU with her . Planned procedure reviewed, incl EUA by learner, exploration, OSIEL, BS, poss removal of one or both ovaries, poss other biopsies/indicated proc. Consent form reviewed. All Q/A. Case d/w Anesth, Plastics, and .

## 2025-04-12 LAB
CULTURE RESULTS: ABNORMAL
SPECIMEN SOURCE: SIGNIFICANT CHANGE UP

## 2025-04-14 ENCOUNTER — APPOINTMENT (OUTPATIENT)
Dept: UROLOGY | Facility: CLINIC | Age: 45
End: 2025-04-14
Payer: COMMERCIAL

## 2025-04-14 ENCOUNTER — NON-APPOINTMENT (OUTPATIENT)
Age: 45
End: 2025-04-14

## 2025-04-14 DIAGNOSIS — N39.0 URINARY TRACT INFECTION, SITE NOT SPECIFIED: ICD-10-CM

## 2025-04-14 PROCEDURE — 99202 OFFICE O/P NEW SF 15 MIN: CPT | Mod: 93

## 2025-04-14 RX ORDER — CIPROFLOXACIN HYDROCHLORIDE 250 MG/1
250 TABLET, FILM COATED ORAL
Qty: 6 | Refills: 0 | Status: ACTIVE | COMMUNITY
Start: 2025-04-14 | End: 1900-01-01

## 2025-04-16 PROBLEM — I10 ESSENTIAL (PRIMARY) HYPERTENSION: Chronic | Status: ACTIVE | Noted: 2025-04-10

## 2025-04-16 PROBLEM — E66.9 OBESITY, UNSPECIFIED: Chronic | Status: ACTIVE | Noted: 2025-04-10

## 2025-04-16 PROBLEM — N85.2 HYPERTROPHY OF UTERUS: Chronic | Status: ACTIVE | Noted: 2025-04-10

## 2025-04-16 PROBLEM — E65 LOCALIZED ADIPOSITY: Chronic | Status: ACTIVE | Noted: 2025-04-10

## 2025-04-16 PROBLEM — D50.9 IRON DEFICIENCY ANEMIA, UNSPECIFIED: Chronic | Status: ACTIVE | Noted: 2025-04-10

## 2025-04-16 PROBLEM — F41.9 ANXIETY DISORDER, UNSPECIFIED: Chronic | Status: ACTIVE | Noted: 2025-04-10

## 2025-04-16 NOTE — ASU PATIENT PROFILE, ADULT - NSICDXPASTMEDICALHX_GEN_ALL_CORE_FT
PAST MEDICAL HISTORY:  Abnormal uterine and vaginal bleeding, unspecified     Anxiety     BMI 31.0-31.9,adult     HTN (hypertension)     Hypertrophy of uterus     Iron deficiency anemia     Leiomyoma of uterus, unspecified     Localized adiposity     Obese

## 2025-04-16 NOTE — ASU PATIENT PROFILE, ADULT - FALL HARM RISK - UNIVERSAL INTERVENTIONS
Bed in lowest position, wheels locked, appropriate side rails in place/Call bell, personal items and telephone in reach/Instruct patient to call for assistance before getting out of bed or chair/Non-slip footwear when patient is out of bed/[place] to call system/Physically safe environment - no spills, clutter or unnecessary equipment/Purposeful Proactive Rounding/Room/bathroom lighting operational, light cord in reach

## 2025-04-17 ENCOUNTER — APPOINTMENT (OUTPATIENT)
Dept: UROLOGY | Facility: HOSPITAL | Age: 45
End: 2025-04-17

## 2025-04-17 ENCOUNTER — INPATIENT (INPATIENT)
Facility: HOSPITAL | Age: 45
LOS: 3 days | Discharge: HOME | End: 2025-04-21
Attending: OBSTETRICS & GYNECOLOGY | Admitting: SURGERY
Payer: COMMERCIAL

## 2025-04-17 ENCOUNTER — TRANSCRIPTION ENCOUNTER (OUTPATIENT)
Age: 45
End: 2025-04-17

## 2025-04-17 ENCOUNTER — APPOINTMENT (OUTPATIENT)
Dept: PLASTIC SURGERY | Facility: HOSPITAL | Age: 45
End: 2025-04-17
Payer: COMMERCIAL

## 2025-04-17 ENCOUNTER — RESULT REVIEW (OUTPATIENT)
Age: 45
End: 2025-04-17

## 2025-04-17 ENCOUNTER — APPOINTMENT (OUTPATIENT)
Dept: GYNECOLOGIC ONCOLOGY | Facility: HOSPITAL | Age: 45
End: 2025-04-17

## 2025-04-17 VITALS
HEART RATE: 81 BPM | OXYGEN SATURATION: 100 % | WEIGHT: 214.95 LBS | HEIGHT: 68.75 IN | RESPIRATION RATE: 16 BRPM | SYSTOLIC BLOOD PRESSURE: 121 MMHG | TEMPERATURE: 98 F | DIASTOLIC BLOOD PRESSURE: 93 MMHG

## 2025-04-17 DIAGNOSIS — Z98.890 OTHER SPECIFIED POSTPROCEDURAL STATES: Chronic | ICD-10-CM

## 2025-04-17 DIAGNOSIS — D25.9 LEIOMYOMA OF UTERUS, UNSPECIFIED: ICD-10-CM

## 2025-04-17 DIAGNOSIS — O02.1 MISSED ABORTION: Chronic | ICD-10-CM

## 2025-04-17 DIAGNOSIS — Z98.891 HISTORY OF UTERINE SCAR FROM PREVIOUS SURGERY: Chronic | ICD-10-CM

## 2025-04-17 DIAGNOSIS — Z97.5 PRESENCE OF (INTRAUTERINE) CONTRACEPTIVE DEVICE: Chronic | ICD-10-CM

## 2025-04-17 LAB
ALBUMIN SERPL ELPH-MCNC: 3.7 G/DL — SIGNIFICANT CHANGE UP (ref 3.3–5)
ALP SERPL-CCNC: 63 U/L — SIGNIFICANT CHANGE UP (ref 40–120)
ALT FLD-CCNC: 13 U/L — SIGNIFICANT CHANGE UP (ref 4–33)
ANION GAP SERPL CALC-SCNC: 11 MMOL/L — SIGNIFICANT CHANGE UP (ref 7–14)
AST SERPL-CCNC: 17 U/L — SIGNIFICANT CHANGE UP (ref 4–32)
BILIRUB SERPL-MCNC: 0.2 MG/DL — SIGNIFICANT CHANGE UP (ref 0.2–1.2)
BUN SERPL-MCNC: 11 MG/DL — SIGNIFICANT CHANGE UP (ref 7–23)
CALCIUM SERPL-MCNC: 8.5 MG/DL — SIGNIFICANT CHANGE UP (ref 8.4–10.5)
CHLORIDE SERPL-SCNC: 105 MMOL/L — SIGNIFICANT CHANGE UP (ref 98–107)
CO2 SERPL-SCNC: 22 MMOL/L — SIGNIFICANT CHANGE UP (ref 22–31)
CREAT SERPL-MCNC: 0.74 MG/DL — SIGNIFICANT CHANGE UP (ref 0.5–1.3)
EGFR: 102 ML/MIN/1.73M2 — SIGNIFICANT CHANGE UP
EGFR: 102 ML/MIN/1.73M2 — SIGNIFICANT CHANGE UP
GLUCOSE BLDC GLUCOMTR-MCNC: 86 MG/DL — SIGNIFICANT CHANGE UP (ref 70–99)
GLUCOSE SERPL-MCNC: 133 MG/DL — HIGH (ref 70–99)
HCG UR QL: NEGATIVE — SIGNIFICANT CHANGE UP
HCT VFR BLD CALC: 43.1 % — SIGNIFICANT CHANGE UP (ref 34.5–45)
HGB BLD-MCNC: 14 G/DL — SIGNIFICANT CHANGE UP (ref 11.5–15.5)
MAGNESIUM SERPL-MCNC: 1.8 MG/DL — SIGNIFICANT CHANGE UP (ref 1.6–2.6)
MCHC RBC-ENTMCNC: 24.7 PG — LOW (ref 27–34)
MCHC RBC-ENTMCNC: 32.5 G/DL — SIGNIFICANT CHANGE UP (ref 32–36)
MCV RBC AUTO: 76 FL — LOW (ref 80–100)
NRBC # BLD AUTO: 0 K/UL — SIGNIFICANT CHANGE UP (ref 0–0)
NRBC # FLD: 0 K/UL — SIGNIFICANT CHANGE UP (ref 0–0)
NRBC BLD AUTO-RTO: 0 /100 WBCS — SIGNIFICANT CHANGE UP (ref 0–0)
PHOSPHATE SERPL-MCNC: 4.1 MG/DL — SIGNIFICANT CHANGE UP (ref 2.5–4.5)
PLATELET # BLD AUTO: 341 K/UL — SIGNIFICANT CHANGE UP (ref 150–400)
POTASSIUM SERPL-MCNC: 4.4 MMOL/L — SIGNIFICANT CHANGE UP (ref 3.5–5.3)
POTASSIUM SERPL-SCNC: 4.4 MMOL/L — SIGNIFICANT CHANGE UP (ref 3.5–5.3)
PROT SERPL-MCNC: 6.9 G/DL — SIGNIFICANT CHANGE UP (ref 6–8.3)
RBC # BLD: 5.67 M/UL — HIGH (ref 3.8–5.2)
RBC # FLD: 16 % — HIGH (ref 10.3–14.5)
SODIUM SERPL-SCNC: 138 MMOL/L — SIGNIFICANT CHANGE UP (ref 135–145)
WBC # BLD: 17.94 K/UL — HIGH (ref 3.8–10.5)
WBC # FLD AUTO: 17.94 K/UL — HIGH (ref 3.8–10.5)

## 2025-04-17 PROCEDURE — 15877 SUCTION LIPECTOMY TRUNK: CPT

## 2025-04-17 PROCEDURE — 49591 RPR AA HRN 1ST < 3 CM RDC: CPT

## 2025-04-17 PROCEDURE — 88305 TISSUE EXAM BY PATHOLOGIST: CPT | Mod: 26

## 2025-04-17 PROCEDURE — 58150 TOTAL HYSTERECTOMY: CPT

## 2025-04-17 PROCEDURE — 49186 OPN EXC/DSTR NTRA-ABD 5 CM/<: CPT

## 2025-04-17 PROCEDURE — 88307 TISSUE EXAM BY PATHOLOGIST: CPT | Mod: 26

## 2025-04-17 PROCEDURE — 88300 SURGICAL PATH GROSS: CPT | Mod: 26,59

## 2025-04-17 PROCEDURE — 88302 TISSUE EXAM BY PATHOLOGIST: CPT | Mod: 26

## 2025-04-17 PROCEDURE — 52005 CYSTO W/URTRL CATHJ: CPT

## 2025-04-17 PROCEDURE — 15830 EXC EXCESSIVE SKIN ABDOMEN: CPT | Mod: 59

## 2025-04-17 PROCEDURE — 15834 EXC EXCESSIVE SKIN HIP: CPT | Mod: 50

## 2025-04-17 DEVICE — LIGATING CLIPS WECK HORIZON LARGE (ORANGE) 24: Type: IMPLANTABLE DEVICE | Site: BILATERAL | Status: FUNCTIONAL

## 2025-04-17 DEVICE — CLIP APPLIER COVIDIEN SURGICLIP 11.5" MEDIUM: Type: IMPLANTABLE DEVICE | Site: BILATERAL | Status: FUNCTIONAL

## 2025-04-17 DEVICE — GUIDEWIRE SENSOR DUAL-FLEX NITINOL STRAIGHT .038" X 150CM: Type: IMPLANTABLE DEVICE | Site: BILATERAL | Status: FUNCTIONAL

## 2025-04-17 DEVICE — ARISTA 3GR: Type: IMPLANTABLE DEVICE | Site: BILATERAL | Status: FUNCTIONAL

## 2025-04-17 DEVICE — URETERAL CATH OPEN END 5FR 70CM: Type: IMPLANTABLE DEVICE | Site: BILATERAL | Status: FUNCTIONAL

## 2025-04-17 DEVICE — LIGATING CLIPS WECK HORIZON MEDIUM (BLUE) 24: Type: IMPLANTABLE DEVICE | Site: BILATERAL | Status: FUNCTIONAL

## 2025-04-17 RX ORDER — SIMETHICONE 80 MG
160 TABLET,CHEWABLE ORAL ONCE
Refills: 0 | Status: COMPLETED | OUTPATIENT
Start: 2025-04-17 | End: 2025-04-18

## 2025-04-17 RX ORDER — FENTANYL CITRATE-0.9 % NACL/PF 100MCG/2ML
25 SYRINGE (ML) INTRAVENOUS
Refills: 0 | Status: DISCONTINUED | OUTPATIENT
Start: 2025-04-17 | End: 2025-04-17

## 2025-04-17 RX ORDER — HYDROMORPHONE/SOD CHLOR,ISO/PF 2 MG/10 ML
0.5 SYRINGE (ML) INJECTION
Refills: 0 | Status: DISCONTINUED | OUTPATIENT
Start: 2025-04-17 | End: 2025-04-21

## 2025-04-17 RX ORDER — ACETAMINOPHEN 500 MG/5ML
975 LIQUID (ML) ORAL EVERY 6 HOURS
Refills: 0 | Status: DISCONTINUED | OUTPATIENT
Start: 2025-04-17 | End: 2025-04-21

## 2025-04-17 RX ORDER — OXYCODONE HYDROCHLORIDE 30 MG/1
10 TABLET ORAL EVERY 4 HOURS
Refills: 0 | Status: DISCONTINUED | OUTPATIENT
Start: 2025-04-17 | End: 2025-04-21

## 2025-04-17 RX ORDER — SODIUM CHLORIDE 9 G/1000ML
1000 INJECTION, SOLUTION INTRAVENOUS
Refills: 0 | Status: DISCONTINUED | OUTPATIENT
Start: 2025-04-17 | End: 2025-04-18

## 2025-04-17 RX ORDER — ONDANSETRON HCL/PF 4 MG/2 ML
4 VIAL (ML) INJECTION ONCE
Refills: 0 | Status: COMPLETED | OUTPATIENT
Start: 2025-04-17 | End: 2025-04-17

## 2025-04-17 RX ORDER — METOCLOPRAMIDE HCL 10 MG
10 TABLET ORAL ONCE
Refills: 0 | Status: COMPLETED | OUTPATIENT
Start: 2025-04-17 | End: 2025-04-17

## 2025-04-17 RX ORDER — HEPARIN SODIUM 1000 [USP'U]/ML
5000 INJECTION INTRAVENOUS; SUBCUTANEOUS EVERY 8 HOURS
Refills: 0 | Status: DISCONTINUED | OUTPATIENT
Start: 2025-04-17 | End: 2025-04-21

## 2025-04-17 RX ORDER — OXYCODONE HYDROCHLORIDE 30 MG/1
5 TABLET ORAL ONCE
Refills: 0 | Status: DISCONTINUED | OUTPATIENT
Start: 2025-04-17 | End: 2025-04-17

## 2025-04-17 RX ORDER — BUSPIRONE HYDROCHLORIDE 15 MG/1
1 TABLET ORAL
Refills: 0 | DISCHARGE

## 2025-04-17 RX ORDER — METOPROLOL SUCCINATE 50 MG/1
5 TABLET, EXTENDED RELEASE ORAL EVERY 6 HOURS
Refills: 0 | Status: DISCONTINUED | OUTPATIENT
Start: 2025-04-17 | End: 2025-04-18

## 2025-04-17 RX ORDER — OXYCODONE HYDROCHLORIDE 30 MG/1
5 TABLET ORAL EVERY 4 HOURS
Refills: 0 | Status: DISCONTINUED | OUTPATIENT
Start: 2025-04-17 | End: 2025-04-21

## 2025-04-17 RX ORDER — METOPROLOL SUCCINATE 50 MG/1
1 TABLET, EXTENDED RELEASE ORAL
Refills: 0 | DISCHARGE

## 2025-04-17 RX ORDER — KETOROLAC TROMETHAMINE 30 MG/ML
15 INJECTION, SOLUTION INTRAMUSCULAR; INTRAVENOUS EVERY 6 HOURS
Refills: 0 | Status: DISCONTINUED | OUTPATIENT
Start: 2025-04-17 | End: 2025-04-21

## 2025-04-17 RX ORDER — MAGNESIUM SULFATE 500 MG/ML
2 SYRINGE (ML) INJECTION ONCE
Refills: 0 | Status: COMPLETED | OUTPATIENT
Start: 2025-04-17 | End: 2025-04-17

## 2025-04-17 RX ORDER — SENNA 187 MG
2 TABLET ORAL AT BEDTIME
Refills: 0 | Status: DISCONTINUED | OUTPATIENT
Start: 2025-04-17 | End: 2025-04-21

## 2025-04-17 RX ORDER — ONDANSETRON HCL/PF 4 MG/2 ML
4 VIAL (ML) INJECTION EVERY 6 HOURS
Refills: 0 | Status: DISCONTINUED | OUTPATIENT
Start: 2025-04-17 | End: 2025-04-21

## 2025-04-17 RX ADMIN — Medication 0.5 MILLIGRAM(S): at 15:49

## 2025-04-17 RX ADMIN — OXYCODONE HYDROCHLORIDE 5 MILLIGRAM(S): 30 TABLET ORAL at 17:00

## 2025-04-17 RX ADMIN — Medication 1 APPLICATION(S): at 07:13

## 2025-04-17 RX ADMIN — OXYCODONE HYDROCHLORIDE 5 MILLIGRAM(S): 30 TABLET ORAL at 16:38

## 2025-04-17 RX ADMIN — OXYCODONE HYDROCHLORIDE 5 MILLIGRAM(S): 30 TABLET ORAL at 22:29

## 2025-04-17 RX ADMIN — HEPARIN SODIUM 5000 UNIT(S): 1000 INJECTION INTRAVENOUS; SUBCUTANEOUS at 21:17

## 2025-04-17 RX ADMIN — SODIUM CHLORIDE 125 MILLILITER(S): 9 INJECTION, SOLUTION INTRAVENOUS at 18:59

## 2025-04-17 RX ADMIN — Medication 4 MILLIGRAM(S): at 15:49

## 2025-04-17 RX ADMIN — Medication 4 MILLIGRAM(S): at 22:35

## 2025-04-17 RX ADMIN — SODIUM CHLORIDE 30 MILLILITER(S): 9 INJECTION, SOLUTION INTRAVENOUS at 07:14

## 2025-04-17 RX ADMIN — Medication 50 GRAM(S): at 16:35

## 2025-04-17 RX ADMIN — Medication 975 MILLIGRAM(S): at 19:59

## 2025-04-17 RX ADMIN — Medication 0.5 MILLIGRAM(S): at 16:15

## 2025-04-17 RX ADMIN — SODIUM CHLORIDE 125 MILLILITER(S): 9 INJECTION, SOLUTION INTRAVENOUS at 15:44

## 2025-04-17 RX ADMIN — Medication 975 MILLIGRAM(S): at 18:59

## 2025-04-17 RX ADMIN — Medication 10 MILLIGRAM(S): at 18:59

## 2025-04-17 RX ADMIN — OXYCODONE HYDROCHLORIDE 5 MILLIGRAM(S): 30 TABLET ORAL at 23:29

## 2025-04-17 NOTE — PATIENT PROFILE ADULT - FALL HARM RISK - HARM RISK INTERVENTIONS

## 2025-04-17 NOTE — BRIEF OPERATIVE NOTE - NSICDXBRIEFPREOP_GEN_ALL_CORE_FT
PRE-OP DIAGNOSIS:  Fibroid uterus 17-Apr-2025 12:08:31  Isabella Cruz  
PRE-OP DIAGNOSIS:  Fibroid uterus 17-Apr-2025 12:08:31  Isabella Cruz

## 2025-04-17 NOTE — PATIENT PROFILE ADULT - HISTORY OF COVID-19 VACCINATION
Glenda called stating that Jl is currently on his last pill and needs to have a refill on his Lamictal.    Please advise  
KAREL 3/22/2018   Last seen: 3/23/2017   Last refilled:5/8/2017   Medication:lamoTRIgine (LAMICTAL) 100 MG tablet  Sig: Take 1 tablet by mouth daily.   Last note reviewed: lamoTRIgine (LAMICTAL) 100 MG tablet; Take 1 tablet by mouth daily.  Dispense: 90 tablet; Refill: 1  Refill sent.   
Yes

## 2025-04-17 NOTE — BRIEF OPERATIVE NOTE - SPECIMENS
peritoneal paratubal cyst, uterus and cervix with IUD in place, R fallopian tube, L ovarian cyst wall, L fallopian tube, adherent omentum
Hilda

## 2025-04-17 NOTE — PATIENT PROFILE ADULT - PRO INTERPRETER NEED 2
Per , pt can take 0.5 tablet every other day x 2 weeks then increase to 1 tablet daily. If symptoms do not resolve in the next 7-10 days she is to call the office so we can switch medications.     Per pt, message left with above directions.     English

## 2025-04-17 NOTE — PACU DISCHARGE NOTE - PAIN:
Population Health Chart Review & Patient Outreach Details      Additional [facility] Health Notes:               Updates Requested / Reviewed:      Updated Care Coordination Note, Care Everywhere, Care Team Updated, and Immunizations Reconciliation Completed or Queried: [place]         Health Maintenance Topics Overdue:      [facility] Score: 5     Colon Cancer Screening  Urine Screening  Eye Exam  Foot Exam  Uncontrolled BP                       Health Maintenance Topic(s) Outreach Outcomes & Actions Taken:    Lab(s) - Outreach Outcomes & Actions Taken  : Overdue Lab(s) Scheduled       Controlled with current regime

## 2025-04-17 NOTE — BRIEF OPERATIVE NOTE - NSICDXBRIEFPOSTOP_GEN_ALL_CORE_FT
POST-OP DIAGNOSIS:  Fibroid uterus 17-Apr-2025 12:08:34  Isabella Cruz   POST-OP DIAGNOSIS:  Fibroid uterus 17-Apr-2025 12:08:34  Isabella Cruz  Left ovarian cyst 17-Apr-2025 20:45:38  Donovan Lopez  Abdominal adhesions 17-Apr-2025 20:45:49  Donovan Lopez  Pelvic adhesions 17-Apr-2025 20:45:58  Donovan Lopez  Peritoneal cyst 17-Apr-2025 20:56:43  Donovan Lopez

## 2025-04-17 NOTE — BRIEF OPERATIVE NOTE - OPERATION/FINDINGS
Ureteral catheters placed by urology  Opening and closure performed by plastic surgery  EUA with bulky 20w size mobile uterus, rectovaginal exam unremarkable  Laparotomy demonstrated adhesions of omentum to anterior abdominal wall (partial omentectomy of adhesed portion), filmy adhesions of sigmoid colon to L adnexa, otherwise grossly normal R fallopian tube and ovary. L ovary enlarged to 3cm with simple-appearing cyst and L fallopian tube wrapped around L round ligament and adhesed to anterior aspect of uterus. Uterus grossly enlarged with multiple subserosal fibroids.   Bladder backfilled with methylene blue with no extravasation or thinning of peritoneum noted  Saniya and fibrillar over cuff, bilateral ovaries wrapped in fibrillar and L ovarian cystectomy bed filled with Saniya  Good hemostasis   Ureteral catheters placed by urology  Opening and closure performed by plastic surgery  EUA with bulky 20w size mobile uterus, rectovaginal exam unremarkable  Laparotomy demonstrated adhesions of omentum to anterior abdominal wall (partial omentectomy of adhesed portion), including are to right of midline with preperitoneal defect and adherent omentum; filmy adhesions of sigmoid colon to L adnexa, otherwise grossly normal R fallopian tube and ovary. L ovary enlarged to 5cm with cystic change and L fallopian tube wrapped around L round ligament and adhesed to anterior aspect of uterus. Paratubal peritoneal cyst also excised. Uterus enlarged to level of the umbilicus with multiple fibroids.   Bladder backfilled with methylene blue with no extravasation or thinning of wall noted. Ureters checked throughout the case. Cyst excised form left ovary. After confirming hemostasis, ther ovary was reconstructed in layers.   Saniya and fibrillar over cuff, bilateral ovaries wrapped in fibrillar and L ovarian cystectomy bed filled with Saniya  Excellent hemostasis. Copious irrigation, saline and water. CC.

## 2025-04-17 NOTE — BRIEF OPERATIVE NOTE - NSICDXBRIEFPROCEDURE_GEN_ALL_CORE_FT
PROCEDURES:  Total abdominal hysterectomy with bilateral salpingectomy 17-Apr-2025 12:07:20  Isabella Cruz  Open left ovarian cystectomy 17-Apr-2025 12:07:35  Isabella Cruz  Lysis of pelvic adhesions 17-Apr-2025 12:08:18  Isabella Cruz  Partial omentectomy 17-Apr-2025 12:08:24  Isabella Cruz   PROCEDURES:  Total abdominal hysterectomy with bilateral salpingectomy 17-Apr-2025 12:07:20  Isabella Cruz  Open left ovarian cystectomy 17-Apr-2025 12:07:35  Isabella Cruz  Lysis of pelvic adhesions 17-Apr-2025 12:08:18  Isabella Cruz  Partial omentectomy 17-Apr-2025 12:08:24  Isabella Cruz  Open biopsy of lesion of peritoneal cavity 17-Apr-2025 20:56:15  Donovan Lopez

## 2025-04-18 ENCOUNTER — TRANSCRIPTION ENCOUNTER (OUTPATIENT)
Age: 45
End: 2025-04-18

## 2025-04-18 LAB
ANION GAP SERPL CALC-SCNC: 12 MMOL/L — SIGNIFICANT CHANGE UP (ref 7–14)
BASOPHILS # BLD AUTO: 0.03 K/UL — SIGNIFICANT CHANGE UP (ref 0–0.2)
BASOPHILS NFR BLD AUTO: 0.3 % — SIGNIFICANT CHANGE UP (ref 0–2)
BUN SERPL-MCNC: 11 MG/DL — SIGNIFICANT CHANGE UP (ref 7–23)
CALCIUM SERPL-MCNC: 8.2 MG/DL — LOW (ref 8.4–10.5)
CHLORIDE SERPL-SCNC: 103 MMOL/L — SIGNIFICANT CHANGE UP (ref 98–107)
CO2 SERPL-SCNC: 21 MMOL/L — LOW (ref 22–31)
CREAT SERPL-MCNC: 0.73 MG/DL — SIGNIFICANT CHANGE UP (ref 0.5–1.3)
EGFR: 104 ML/MIN/1.73M2 — SIGNIFICANT CHANGE UP
EGFR: 104 ML/MIN/1.73M2 — SIGNIFICANT CHANGE UP
EOSINOPHIL # BLD AUTO: 0.02 K/UL — SIGNIFICANT CHANGE UP (ref 0–0.5)
EOSINOPHIL NFR BLD AUTO: 0.2 % — SIGNIFICANT CHANGE UP (ref 0–6)
GLUCOSE SERPL-MCNC: 95 MG/DL — SIGNIFICANT CHANGE UP (ref 70–99)
HCT VFR BLD CALC: 36.1 % — SIGNIFICANT CHANGE UP (ref 34.5–45)
HGB BLD-MCNC: 11.8 G/DL — SIGNIFICANT CHANGE UP (ref 11.5–15.5)
IANC: 8.16 K/UL — HIGH (ref 1.8–7.4)
IMM GRANULOCYTES NFR BLD AUTO: 0.4 % — SIGNIFICANT CHANGE UP (ref 0–0.9)
LYMPHOCYTES # BLD AUTO: 18.1 % — SIGNIFICANT CHANGE UP (ref 13–44)
LYMPHOCYTES # BLD AUTO: 2.03 K/UL — SIGNIFICANT CHANGE UP (ref 1–3.3)
MCHC RBC-ENTMCNC: 24.5 PG — LOW (ref 27–34)
MCHC RBC-ENTMCNC: 32.7 G/DL — SIGNIFICANT CHANGE UP (ref 32–36)
MCV RBC AUTO: 74.9 FL — LOW (ref 80–100)
MONOCYTES # BLD AUTO: 0.91 K/UL — HIGH (ref 0–0.9)
MONOCYTES NFR BLD AUTO: 8.1 % — SIGNIFICANT CHANGE UP (ref 2–14)
NEUTROPHILS # BLD AUTO: 8.16 K/UL — HIGH (ref 1.8–7.4)
NEUTROPHILS NFR BLD AUTO: 72.9 % — SIGNIFICANT CHANGE UP (ref 43–77)
NRBC # BLD AUTO: 0 K/UL — SIGNIFICANT CHANGE UP (ref 0–0)
NRBC # FLD: 0 K/UL — SIGNIFICANT CHANGE UP (ref 0–0)
NRBC BLD AUTO-RTO: 0 /100 WBCS — SIGNIFICANT CHANGE UP (ref 0–0)
PLATELET # BLD AUTO: 342 K/UL — SIGNIFICANT CHANGE UP (ref 150–400)
POTASSIUM SERPL-MCNC: 4.2 MMOL/L — SIGNIFICANT CHANGE UP (ref 3.5–5.3)
POTASSIUM SERPL-SCNC: 4.2 MMOL/L — SIGNIFICANT CHANGE UP (ref 3.5–5.3)
RBC # BLD: 4.82 M/UL — SIGNIFICANT CHANGE UP (ref 3.8–5.2)
RBC # FLD: 16.3 % — HIGH (ref 10.3–14.5)
SODIUM SERPL-SCNC: 136 MMOL/L — SIGNIFICANT CHANGE UP (ref 135–145)
WBC # BLD: 11.19 K/UL — HIGH (ref 3.8–10.5)
WBC # FLD AUTO: 11.19 K/UL — HIGH (ref 3.8–10.5)

## 2025-04-18 PROCEDURE — 99223 1ST HOSP IP/OBS HIGH 75: CPT

## 2025-04-18 RX ORDER — METOPROLOL SUCCINATE 50 MG/1
50 TABLET, EXTENDED RELEASE ORAL DAILY
Refills: 0 | Status: DISCONTINUED | OUTPATIENT
Start: 2025-04-18 | End: 2025-04-20

## 2025-04-18 RX ORDER — SIMETHICONE 80 MG
80 TABLET,CHEWABLE ORAL EVERY 6 HOURS
Refills: 0 | Status: DISCONTINUED | OUTPATIENT
Start: 2025-04-18 | End: 2025-04-21

## 2025-04-18 RX ADMIN — OXYCODONE HYDROCHLORIDE 5 MILLIGRAM(S): 30 TABLET ORAL at 03:22

## 2025-04-18 RX ADMIN — Medication 975 MILLIGRAM(S): at 01:36

## 2025-04-18 RX ADMIN — OXYCODONE HYDROCHLORIDE 5 MILLIGRAM(S): 30 TABLET ORAL at 17:40

## 2025-04-18 RX ADMIN — Medication 975 MILLIGRAM(S): at 06:05

## 2025-04-18 RX ADMIN — Medication 3 MILLILITER(S): at 14:57

## 2025-04-18 RX ADMIN — OXYCODONE HYDROCHLORIDE 10 MILLIGRAM(S): 30 TABLET ORAL at 21:34

## 2025-04-18 RX ADMIN — OXYCODONE HYDROCHLORIDE 5 MILLIGRAM(S): 30 TABLET ORAL at 04:22

## 2025-04-18 RX ADMIN — Medication 80 MILLIGRAM(S): at 13:14

## 2025-04-18 RX ADMIN — Medication 4 MILLIGRAM(S): at 08:54

## 2025-04-18 RX ADMIN — HEPARIN SODIUM 5000 UNIT(S): 1000 INJECTION INTRAVENOUS; SUBCUTANEOUS at 13:13

## 2025-04-18 RX ADMIN — HEPARIN SODIUM 5000 UNIT(S): 1000 INJECTION INTRAVENOUS; SUBCUTANEOUS at 21:38

## 2025-04-18 RX ADMIN — Medication 160 MILLIGRAM(S): at 08:55

## 2025-04-18 RX ADMIN — OXYCODONE HYDROCHLORIDE 5 MILLIGRAM(S): 30 TABLET ORAL at 16:49

## 2025-04-18 RX ADMIN — Medication 975 MILLIGRAM(S): at 12:26

## 2025-04-18 RX ADMIN — OXYCODONE HYDROCHLORIDE 5 MILLIGRAM(S): 30 TABLET ORAL at 12:26

## 2025-04-18 RX ADMIN — OXYCODONE HYDROCHLORIDE 5 MILLIGRAM(S): 30 TABLET ORAL at 13:30

## 2025-04-18 RX ADMIN — Medication 975 MILLIGRAM(S): at 00:36

## 2025-04-18 RX ADMIN — OXYCODONE HYDROCHLORIDE 10 MILLIGRAM(S): 30 TABLET ORAL at 20:34

## 2025-04-18 RX ADMIN — Medication 975 MILLIGRAM(S): at 18:34

## 2025-04-18 RX ADMIN — Medication 80 MILLIGRAM(S): at 21:37

## 2025-04-18 RX ADMIN — HEPARIN SODIUM 5000 UNIT(S): 1000 INJECTION INTRAVENOUS; SUBCUTANEOUS at 06:06

## 2025-04-18 RX ADMIN — KETOROLAC TROMETHAMINE 15 MILLIGRAM(S): 30 INJECTION, SOLUTION INTRAMUSCULAR; INTRAVENOUS at 08:54

## 2025-04-18 RX ADMIN — KETOROLAC TROMETHAMINE 15 MILLIGRAM(S): 30 INJECTION, SOLUTION INTRAMUSCULAR; INTRAVENOUS at 09:30

## 2025-04-18 NOTE — DISCHARGE NOTE PROVIDER - NSDCFUADDINST_GEN_ALL_CORE_FT
Return to your regular way of eating.  Resume normal activity as tolerated, but no heavy lifting or strenuous activity for 6 weeks.  No driving for next 2 weeks and/or while on narcotic pain medication.  Complete vaginal rest, no tampons, no douching, no tub bathing, no sexual activities for 8 weeks unless otherwise instructed by your doctor.  Call your doctor with any signs and symptoms of infection such as fever, chills, nausea or vomiting. Call your doctor with redness or swelling at the incision site, fluid leakage or wound separation. Call your doctor if you're unable to tolerate food or have difficulty urinating.  Call your doctor if you have pain that is not relieved by your prescribed medications.  Notify your doctor with any other concerns.

## 2025-04-18 NOTE — PROGRESS NOTE ADULT - ASSESSMENT
44y female POD1 s/p Ex-lap, Total abdominal hysterectomy with bilateral salpingectomy,  left ovarian cystectomy, partial omentectomy and  Lysis of pelvic adhesions for fibroid uterus and panniculectomy with rectus plication by plastic surgery. Pt progressing well.     Neuro: Tylenol, Dilaudid PRN, Toradol PRN, Oxy PRN    CV: Hemodynamically stable, Lopressor 5 PRN  Hct: 43.1->36.1  -JOSHUA drain in place- continue with drain care  Pulm: Saturating well on RA. Increase incentive spirometry.  GI: Plan to advance diet today   : Garcia in place with ucath. Consider removal this AM.  Heme: Continue HSQ/ Venodynes for DVT ppx. Increase OOB.    ID: Afebrile  Endo: No active issues   Dispo: continue inpatient care    Pt seen with GYN Onc team  Joy Arriaga, PGY1

## 2025-04-18 NOTE — CHART NOTE - NSCHARTNOTEFT_GEN_A_CORE
GYNECOLOGIC ONCOLOGY PA Post Op NOTE    Patient seen and examined at bedside, resting comfortably  with good pain control. Patient denies headache, dizziness, nausea, vomiting, chest pain, palpitations and sob. Patient is tolerating sips of water.  Verdugo in situ with adequate uop. Left ucath remains in place. Right ucath removed earlier upon admission to PACU.     OBJECTIVE:     T(C): 36.7 (04-17-25 @ 16:00), Max: 36.8 (04-17-25 @ 14:05)  HR: 96 (04-17-25 @ 17:00) (81 - 96)  BP: 125/84 (04-17-25 @ 17:00) (111/85 - 143/92)  RR: 22 (04-17-25 @ 17:00) (16 - 24)  SpO2: 96% (04-17-25 @ 17:00) (92% - 100%)      04-17-25 @ 07:01  -  04-17-25 @ 17:15  --------------------------------------------------------  IN: 250 mL / OUT: 199 mL / NET: 51 mL        acetaminophen     Tablet .. 975 milliGRAM(s) Oral every 6 hours  heparin   Injectable 5000 Unit(s) SubCutaneous every 8 hours  HYDROmorphone  Injectable 0.5 milliGRAM(s) IV Push every 10 minutes PRN  ketorolac   Injectable 15 milliGRAM(s) IV Push every 6 hours PRN  lactated ringers. 1000 milliLiter(s) IV Continuous <Continuous>  ondansetron Injectable 4 milliGRAM(s) IV Push every 6 hours PRN  oxyCODONE    IR 5 milliGRAM(s) Oral every 4 hours PRN  oxyCODONE    IR 10 milliGRAM(s) Oral every 4 hours PRN  senna 2 Tablet(s) Oral at bedtime PRN  simethicone 160 milliGRAM(s) Chew once PRN    Labs:              14.0   17.94 )-----------( 341      ( 04-17 @ 15:16 )             43.1     04-17    138  |  105  |  11  ----------------------------<  133[H]  4.4   |  22  |  0.74    Ca    8.5      17 Apr 2025 15:16  Phos  4.1     04-17  Mg     1.80     04-17    TPro  6.9  /  Alb  3.7  /  TBili  0.2  /  DBili  x   /  AST  17  /  ALT  13  /  AlkPhos  63  04-17      Physical Exam:  Constitutional: NAD  Pulmonary: clear to auscultation bilaterally   Cardiovascular: Regular rate and rhythm   Abdomen: soft, non-distended, appropriately tender, Incision: w/dermabond prineo dressing C/D/I binder in place  Extremities: no lower extremity edema or calve tenderness, bilat venodynes in place      ASSESSMENT/PLAN:  44yfemale s/p Ex-lap, Total abdominal hysterectomy with bilateral salpingectomy,  left ovarian cystectomy, partial omentectomy and  Lysis of pelvic adhesions for fibroid uterus. EBL: 200. Patient also s/p Panniculectomy with rectus plication and 2 JOSHUA drains (by plastics, CHRISTIAN Mitchell)EBL: 20.See operative note for details. Hypomagnesemia noted on PACU labs.  -LR@125  -HSQ q 8hrs  -CLD overnight  -verdugo catheter, L ucath, 2JP drains (monitor output)  -IV Tylenol, Toradol, prn Oxycodone  -bilateral venodynes when resting in bed  -bed rest tonight,   -elevate head of bed 30 degrees; do not flatten bed to turn patient  -VS per routine  -Lopressor prn (h/o HTN)  -Mylicon/Senna  -GHOS consult in am  -Mg Sulfate repletion (Mg 1.8)  -f/u am CBC, BMP -replete lytes prn   -appreciate plastics recs    Disposition: admit to 4T for routine post operative care    Nery Lewis PA-C  #[id number]
R4 Ureteral Catheter Removal Note    Patient seen at bedside after moved to PACU by plastic surgery following conclusion of abdominal panniculectomy and liposuction () after ex-lap OSIEL BS L ovarian cystectomy, FELIX, partial omentectomy ().    R ureteral catheter removed at bedside, intact. Patient tolerated well.    d/w attg Dr John Cruz  PGY4
Ucath Removal  Patient seen and examined at bedside for removal of remaining ucath. Patient tolerated well without complications. Will continue to follow.    Kaylee Miguel PA-C

## 2025-04-18 NOTE — PROGRESS NOTE ADULT - ASSESSMENT
ASSESSMENT/PLAN:   CHELY ASCENCIO is a 44yFemale s/p Panniculectomy with rectus plication and PRS closure    - Continue to monitor drain OP  - JOSHUA care  - Okay for dispo per primary w/ close follow-up    Valentino Arriola MD   [facility] Surgery Resident PGY1    For any questions, please DO NOT reach out via Teams.    Plastic Surgery   [facility]: [id number]  [facility]: [phone]

## 2025-04-18 NOTE — DISCHARGE NOTE PROVIDER - NSDCMRMEDTOKEN_GEN_ALL_CORE_FT
BuSpar 10 mg oral tablet: 1 tab(s) orally as needed for  anxiety  Metoprolol Succinate ER 50 mg oral tablet, extended release: 1 tab(s) orally once a day  multivitamin: 1 tab(s) orally once a day   acetaminophen 325 mg oral tablet: 3 tab(s) orally every 6 hours as needed for  moderate pain  BuSpar 10 mg oral tablet: 1 tab(s) orally as needed for  anxiety  ibuprofen 600 mg oral tablet: 1 tab(s) orally every 6 hours as needed for  moderate pain  Metoprolol Succinate ER 50 mg oral tablet, extended release: 1 tab(s) orally once a day  multivitamin: 1 tab(s) orally once a day  oxyCODONE 5 mg oral tablet: 1 tab(s) orally every 6 hours as needed for  severe pain MDD: 4   acetaminophen 325 mg oral tablet: 3 tab(s) orally every 6 hours as needed for  moderate pain  BuSpar 10 mg oral tablet: 1 tab(s) orally as needed for  anxiety  ibuprofen 600 mg oral tablet: 1 tab(s) orally every 6 hours as needed for  moderate pain  Metoprolol Succinate ER 50 mg oral tablet, extended release: 1 tab(s) orally once a day  multivitamin: 1 tab(s) orally once a day  ondansetron 4 mg oral tablet, disintegratin tab(s) orally every 6 hours  oxyCODONE 5 mg oral tablet: 1 tab(s) orally every 6 hours as needed for  severe pain MDD: 4

## 2025-04-18 NOTE — PROGRESS NOTE ADULT - NSPROGADDITIONALINFOA_GEN_ALL_CORE
AWM: Seen on rounds.  present. Surgery and findings disc. Labs and flow reviewed. Agree with plan. Coverage disc. AWM: Seen on rounds.  present. Surgery and findings disc. Labs and flow reviewed. Agree with plan. Coverage disc. D/W F. Gyn Onc Fellow Addendum:    Pt seen and examined at bedside on AM rounds. Agree with above.  Hemodynamically stable   Continue current pain regimen  ok Oakley removed in AM   Appreciate GHOS recs  Encourage ambulation and IS use  Replete lytes prn  DVT ppx: HSQ, SCDs  Dispo: cont inpt mgmt   Kimberly Neri MD      AWM: Seen on rounds.  present. Surgery and findings disc. Labs and flow reviewed. Agree with plan. Coverage disc. D/W F.

## 2025-04-18 NOTE — DISCHARGE NOTE PROVIDER - NSDCFUSCHEDAPPT_GEN_ALL_CORE_FT
Fawad Garcia  [facility]  [facility]  Scheduled Appointment: 05/01/2025    [facility]  [facility]  Scheduled Appointment: 05/05/2025

## 2025-04-18 NOTE — CONSULT NOTE ADULT - SUBJECTIVE AND OBJECTIVE BOX
PROGRESS NOTE:   Authored by Adeola Haddad Ma, MD  Available on MS Teams; Pager [phone]    Patient is a 44y old  Female who presents with a chief complaint of Scheduled surgery (2025 08:16)    PMHx: recent dx of HTN and anxiety, fibroid uterus c/b anemia 2/2 menorrhagia, improved s/p IUD and iron supplementation  PSHx:  x 2  SHx: Denies tobacco or significant EtOH use  Meds: Toprol 50mg qd, buspar 10mg qd  Allergies: NKDA    SUBJECTIVE / OVERNIGHT EVENTS: NAEON. Pt with some post-surgical pain, receiving PRNs. Has some abdominal cramping with senna, requesting alternative to prevent opiate-induced constipation. No other new or acute symptoms    All other review of systems is negative unless indicated above.    MEDICATIONS  (STANDING):  acetaminophen     Tablet .. 975 milliGRAM(s) Oral every 6 hours  heparin   Injectable 5000 Unit(s) SubCutaneous every 8 hours  simethicone 80 milliGRAM(s) Chew every 6 hours  sodium chloride 0.9% lock flush 3 milliLiter(s) IV Push every 8 hours    MEDICATIONS  (PRN):  HYDROmorphone  Injectable 0.5 milliGRAM(s) IV Push every 10 minutes PRN Severe Pain (7 - 10)  ketorolac   Injectable 15 milliGRAM(s) IV Push every 6 hours PRN Mild Pain (1 - 3)  metoprolol tartrate Injectable 5 milliGRAM(s) IV Push every 6 hours PRN SBP>160, DBP>110  ondansetron Injectable 4 milliGRAM(s) IV Push every 6 hours PRN Nausea and/or Vomiting  oxyCODONE    IR 5 milliGRAM(s) Oral every 4 hours PRN Moderate Pain (4 - 6)  oxyCODONE    IR 10 milliGRAM(s) Oral every 4 hours PRN Severe Pain (7 - 10)  senna 2 Tablet(s) Oral at bedtime PRN Constipation      CAPILLARY BLOOD GLUCOSE        I&O's Summary    2025 07:01  -  2025 07:00  --------------------------------------------------------  IN: 2930 mL / OUT: 1874.5 mL / NET: 1055.5 mL    2025 07:01  -  2025 16:28  --------------------------------------------------------  IN: 880 mL / OUT: 687 mL / NET: 193 mL        PHYSICAL EXAM:  Vital Signs Last 24 Hrs  T(C): 36.6 (2025 13:00), Max: 36.8 (2025 00:46)  T(F): 97.8 (2025 13:00), Max: 98.3 (2025 00:46)  HR: 86 (2025 13:00) (75 - 96)  BP: 111/69 (2025 13:00) (98/56 - 138/92)  BP(mean): 107 (2025 18:00) (97 - 108)  RR: 18 (2025 13:00) (16 - 24)  SpO2: 98% (2025 13:00) (92% - 100%)    Parameters below as of 2025 13:00  Patient On (Oxygen Delivery Method): room air        GENERAL: No acute distress  HEAD:  Normocephalic  EYES: Conjunctiva and sclera clear  NECK: Supple  CHEST/LUNG: CTAB  HEART: Regular rate and rhythm  ABDOMEN: Soft, non-tender, non-distended. Abdominal binder in place. 2 JOSHUA drains with scant serosanguinous output  EXTREMITIES: No clubbing, cyanosis, or edema  NEUROLOGY: A&O x 3  SKIN: No rashes or lesions to visible skin    LABS:                        11.8   11.19 )-----------( 342      ( 2025 04:41 )             36.1     04-18    136  |  103  |  11  ----------------------------<  95  4.2   |  21[L]  |  0.73    Ca    8.2[L]      2025 04:41  Phos  4.1     04-17  Mg     1.80     -17    TPro  6.9  /  Alb  3.7  /  TBili  0.2  /  DBili  x   /  AST  17  /  ALT  13  /  AlkPhos  63  04-17          Urinalysis Basic - ( 2025 04:41 )    Color: x / Appearance: x / SG: x / pH: x  Gluc: 95 mg/dL / Ketone: x  / Bili: x / Urobili: x   Blood: x / Protein: x / Nitrite: x   Leuk Esterase: x / RBC: x / WBC x   Sq Epi: x / Non Sq Epi: x / Bacteria: x            RADIOLOGY & ADDITIONAL TESTS: Reviewed

## 2025-04-18 NOTE — DISCHARGE NOTE PROVIDER - HOSPITAL COURSE
Patient underwent a Exploratory Laparotomy, Total Abdominal Hysterectomy, Bilateral Salpingectomy, L ovarian cystectomy, Lysis of Adhesions, partial omentectomy, bilateral ureteral catheter placement, abdominal panniculectomy and liposuction. EBL: 400. Hct: 41.7->36.1.  POD#0 Pain was well controlled with Tylenol and Oxycodone, and patient tolerated clears. Patient was transitioned to oral analgesics and pain was well controlled.     On POD#0, Patient's diet was advanced and she tolerated regular diet without issues.  Garcia catheter was discontinued and patient voided without issues.   POD#1 *** Labs drawn post-operatively and on POD#1 were stable compared to pre-op. Routine post-operative care was performed.  No notable events.   On day of discharge, patient was deemed stable for discharge as she was meeting postoperative milestones - tolerating regular diet, ambulating without difficulty, voiding, and pain was well controlled on oral medications. Throughout admission, patient received Heparin, DVT prophylaxis with SCDs and early ambulation.     Upon discharge on POD# , the patient is ambulating, voiding spontaneously, tolerating oral intake, pain was well controlled with oral medication, and vital signs were stable. Patient was instructed to follow up with Dr. Lopez on May 5th.        Patient underwent a Exploratory Laparotomy, Total Abdominal Hysterectomy, Bilateral Salpingectomy, L ovarian cystectomy, Lysis of Adhesions, partial omentectomy, bilateral ureteral catheter placement, abdominal panniculectomy and liposuction. EBL: 400. Hct: 41.7->36.1. POD#0 Pain was well controlled with Tylenol and Oxycodone, and patient tolerated clears. Patient was transitioned to oral analgesics and pain was well controlled.  POD#1 Patient voiding spontaneously after removal of verdugo catheter and tolerated a regular diet.       Patient underwent a Exploratory Laparotomy, Total Abdominal Hysterectomy, Bilateral Salpingectomy, L ovarian cystectomy, Lysis of Adhesions, partial omentectomy, bilateral ureteral catheter placement, abdominal panniculectomy and liposuction. EBL: 400. Hct: 41.7->36.1. POD#0 Pain was well controlled with Tylenol and Oxycodone, and patient tolerated clears. Patient was transitioned to oral analgesics and pain was well controlled.POD#1 Patient voiding spontaneously after removal of verdugo catheter and tolerated a regular diet. POD#3-4 Patient with nausea, improved with PO Zofran.            Patient underwent a Exploratory Laparotomy, Total Abdominal Hysterectomy, Bilateral Salpingectomy, L ovarian cystectomy, Lysis of Adhesions, partial omentectomy, bilateral ureteral catheter placement, abdominal panniculectomy and liposuction. EBL: 400. Hct: 41.7->36.1. POD#0 Pain was well controlled with Tylenol and Oxycodone, and patient tolerated clears. Patient was transitioned to oral analgesics and pain was well controlled.POD#1 Patient voiding spontaneously after removal of verdugo catheter and tolerated a regular diet. POD#3 patient with nausea and emesis, pt provided zofran with improvement in symptoms. On POD 4, patient with improved nausea and tolerating regular diet, passing flatus.     On day of discharge, pt stable, voiding spontaneously, ambulating, passing flatus and tolerating PO. Pt to follow up with Dr. Lopez and Dr. Mitchell, plastic surgery, upon discharge.            Patient underwent a Exploratory Laparotomy, Total Abdominal Hysterectomy, Bilateral Salpingectomy, L ovarian cystectomy, Lysis of Adhesions, partial omentectomy, bilateral ureteral catheter placement, abdominal panniculectomy and liposuction. EBL: 400. Hct: 41.7->36.1. POD#0 Pain was well controlled with Tylenol and Oxycodone, and patient tolerated clears. Patient was transitioned to oral analgesics and pain was well controlled.POD#1 Patient voiding spontaneously after removal of verdugo catheter and tolerated a regular diet. POD#3 patient with nausea and emesis, pt provided Zofran with improvement in symptoms. On POD 4, patient with improved nausea and tolerating regular diet, passing flatus.     On day of discharge, pt stable, passing flatus, voiding spontaneously, ambulating, passing flatus and tolerating PO. Pt to follow up with Dr. Lopez and Dr. Mitchell, plastic surgery, upon discharge.

## 2025-04-18 NOTE — PROGRESS NOTE ADULT - SUBJECTIVE AND OBJECTIVE BOX
Plastic Surgery Progress Note (pg [facility]: [id number], [facility]: [phone])    SUBJECTIVE  The patient was seen and examined. No acute events overnight. Pain controlled, afebrile w/ stable vitals.     OBJECTIVE  ___________________________________________________  VITAL SIGNS / I&O's   Vital Signs Last 24 Hrs  T(C): 36.3 (18 Apr 2025 09:21), Max: 36.8 (17 Apr 2025 14:05)  T(F): 97.4 (18 Apr 2025 09:21), Max: 98.3 (18 Apr 2025 00:46)  HR: 75 (18 Apr 2025 09:21) (75 - 96)  BP: 98/56 (18 Apr 2025 09:21) (98/56 - 143/92)  BP(mean): 107 (17 Apr 2025 18:00) (82 - 108)  RR: 17 (18 Apr 2025 09:21) (16 - 24)  SpO2: 96% (18 Apr 2025 09:21) (92% - 100%)    Parameters below as of 18 Apr 2025 09:21  Patient On (Oxygen Delivery Method): room air          17 Apr 2025 07:01  -  18 Apr 2025 07:00  --------------------------------------------------------  IN:    IV PiggyBack: 50 mL    Lactated Ringers: 2000 mL    Oral Fluid: 880 mL  Total IN: 2930 mL    OUT:    Bulb (mL): 54 mL    Bulb (mL): 30.5 mL    Indwelling Catheter - Urethral (mL): 1790 mL  Total OUT: 1874.5 mL    Total NET: 1055.5 mL      18 Apr 2025 07:01  -  18 Apr 2025 10:33  --------------------------------------------------------  IN:  Total IN: 0 mL    OUT:    Indwelling Catheter - Urethral (mL): 100 mL  Total OUT: 100 mL    Total NET: -100 mL        ___________________________________________________  PHYSICAL EXAM  -- CONSTITUTIONAL: NAD, lying in bed  -- NEURO: Awake, alert  -- Abdominal incisions well-appearing, soft and compressible with appropriate incisional TTP. Tissue all healthy and viable appearing. Drains with SS OP    ___________________________________________________  LABS                        11.8   11.19 )-----------( 342      ( 18 Apr 2025 04:41 )             36.1     18 Apr 2025 04:41    136    |  103    |  11     ----------------------------<  95     4.2     |  21     |  0.73     Ca    8.2        18 Apr 2025 04:41  Phos  4.1       17 Apr 2025 15:16  Mg     1.80      17 Apr 2025 15:16    TPro  6.9    /  Alb  3.7    /  TBili  0.2    /  DBili  x      /  AST  17     /  ALT  13     /  AlkPhos  63     17 Apr 2025 15:16      CAPILLARY BLOOD GLUCOSE            Urinalysis Basic - ( 18 Apr 2025 04:41 )    Color: x / Appearance: x / SG: x / pH: x  Gluc: 95 mg/dL / Ketone: x  / Bili: x / Urobili: x   Blood: x / Protein: x / Nitrite: x   Leuk Esterase: x / RBC: x / WBC x   Sq Epi: x / Non Sq Epi: x / Bacteria: x      ___________________________________________________  MICRO  Recent Cultures:    ___________________________________________________  MEDICATIONS  (STANDING):  acetaminophen     Tablet .. 975 milliGRAM(s) Oral every 6 hours  heparin   Injectable 5000 Unit(s) SubCutaneous every 8 hours  lactated ringers. 1000 milliLiter(s) (125 mL/Hr) IV Continuous <Continuous>  simethicone 80 milliGRAM(s) Chew every 6 hours    MEDICATIONS  (PRN):  HYDROmorphone  Injectable 0.5 milliGRAM(s) IV Push every 10 minutes PRN Severe Pain (7 - 10)  ketorolac   Injectable 15 milliGRAM(s) IV Push every 6 hours PRN Mild Pain (1 - 3)  metoprolol tartrate Injectable 5 milliGRAM(s) IV Push every 6 hours PRN SBP>160, DBP>110  ondansetron Injectable 4 milliGRAM(s) IV Push every 6 hours PRN Nausea and/or Vomiting  oxyCODONE    IR 5 milliGRAM(s) Oral every 4 hours PRN Moderate Pain (4 - 6)  oxyCODONE    IR 10 milliGRAM(s) Oral every 4 hours PRN Severe Pain (7 - 10)  senna 2 Tablet(s) Oral at bedtime PRN Constipation

## 2025-04-18 NOTE — CONSULT NOTE ADULT - TIME BILLING
Patient encounter, including chart review, medication review, patient interview, reviewing labs and medications, interpreting labs and imaging results, coordination of care with primary team, and discussing plan with patient, family at bedside, and healthcare team.

## 2025-04-18 NOTE — DISCHARGE NOTE PROVIDER - CARE PROVIDERS DIRECT ADDRESSES
,DirectAddress_Unknown ,DirectAddress_Unknown,[email or web address]

## 2025-04-18 NOTE — PROGRESS NOTE ADULT - SUBJECTIVE AND OBJECTIVE BOX
Pt seen and examined at bedside. Pain well-controlled. Denies flatus. Denies Nausea, Vomiting or Diarrhea. Tolerating liquids.   Denies shortness of breath, chest pain or dyspnea on exertion.    OBJECTIVE:     VITALS:  T(F): 97.8 (04-18-25 @ 13:00), Max: 98.3 (04-18-25 @ 00:46)  HR: 86 (04-18-25 @ 13:00) (75 - 96)  BP: 111/69 (04-18-25 @ 13:00) (98/56 - 138/92)  RR: 18 (04-18-25 @ 13:00) (16 - 24)  SpO2: 98% (04-18-25 @ 13:00) (92% - 100%)  Wt(kg): --    I&O's Summary    17 Apr 2025 07:01  -  18 Apr 2025 07:00  --------------------------------------------------------  IN: 2930 mL / OUT: 1874.5 mL / NET: 1055.5 mL    18 Apr 2025 07:01  -  18 Apr 2025 15:15  --------------------------------------------------------  IN: 880 mL / OUT: 687 mL / NET: 193 mL        MEDICATIONS  (STANDING):  acetaminophen     Tablet .. 975 milliGRAM(s) Oral every 6 hours  heparin   Injectable 5000 Unit(s) SubCutaneous every 8 hours  simethicone 80 milliGRAM(s) Chew every 6 hours  sodium chloride 0.9% lock flush 3 milliLiter(s) IV Push every 8 hours    MEDICATIONS  (PRN):  HYDROmorphone  Injectable 0.5 milliGRAM(s) IV Push every 10 minutes PRN Severe Pain (7 - 10)  ketorolac   Injectable 15 milliGRAM(s) IV Push every 6 hours PRN Mild Pain (1 - 3)  metoprolol tartrate Injectable 5 milliGRAM(s) IV Push every 6 hours PRN SBP>160, DBP>110  ondansetron Injectable 4 milliGRAM(s) IV Push every 6 hours PRN Nausea and/or Vomiting  oxyCODONE    IR 5 milliGRAM(s) Oral every 4 hours PRN Moderate Pain (4 - 6)  oxyCODONE    IR 10 milliGRAM(s) Oral every 4 hours PRN Severe Pain (7 - 10)  senna 2 Tablet(s) Oral at bedtime PRN Constipation      Physical Exam:  Constitutional: NAD  Pulmonary: clear to auscultation bilaterally   Cardiovascular: Regular rate and rhythm   Abdomen: soft, appropriately-tender, non-distended, normal bowel signs, incision clean, dry and intact, drains with serosanguinous output   Extremities: no lower extremity edema or calve tenderness      LABS:                        11.8   11.19 )-----------( 342      ( 18 Apr 2025 04:41 )             36.1     04-18    136  |  103  |  11  ----------------------------<  95  4.2   |  21[L]  |  0.73    Ca    8.2[L]      18 Apr 2025 04:41  Phos  4.1     04-17  Mg     1.80     04-17    TPro  6.9  /  Alb  3.7  /  TBili  0.2  /  DBili  x   /  AST  17  /  ALT  13  /  AlkPhos  63  04-17      Urinalysis Basic - ( 18 Apr 2025 04:41 )

## 2025-04-18 NOTE — DISCHARGE NOTE PROVIDER - NSDCCPTREATMENT_GEN_ALL_CORE_FT
PRINCIPAL PROCEDURE  Procedure: Total abdominal hysterectomy with bilateral salpingectomy  Findings and Treatment:       SECONDARY PROCEDURE  Procedure: Lysis of pelvic adhesions  Findings and Treatment:     Procedure: Partial omentectomy  Findings and Treatment:     Procedure: Panniculectomy  Findings and Treatment:     Procedure: Open left ovarian cystectomy  Findings and Treatment:

## 2025-04-18 NOTE — DISCHARGE NOTE PROVIDER - CARE PROVIDER_API CALL
Donovan Lopez  Gynecologic Oncology  [address] [place] [address]  Phone: [phone]  Fax: [phone]  Follow Up Time:    Donovan Lopez  Gynecologic Oncology  [address] [place] [address]  Phone: [phone]  Fax: [phone]  Follow Up Time:     Raoul Mitchell  Plastic Surgery  [address] [place] [address] [address]  [place] [address]  Phone: [phone]  Fax: [phone]  Follow Up Time: 1-3 days

## 2025-04-18 NOTE — DISCHARGE NOTE PROVIDER - PROVIDER TOKENS
PROVIDER:[TOKEN:[[id number]]] PROVIDER:[TOKEN:[[id number]]],PROVIDER:[TOKEN:[[id number]],FOLLOWUP:[1-3 days]]

## 2025-04-18 NOTE — CONSULT NOTE ADULT - ASSESSMENT
45 y/o F with recent diagnosis of HTN, anxiety, fibroid uterus who is s/p Ex-lap, Total abdominal hysterectomy with bilateral salpingectomy,  left ovarian cystectomy, partial omentectomy and  Lysis of pelvic adhesions for fibroid uterus and panniculectomy with rectus plication by plastic surgery on 4/17    #Fibroid uterus  -s/p hysterectomy on 4/17  -Pain management per primary  -Recommend stopping senna given pt reported abdominal cramping  -Pt requesting colace, can add miralax PRN as well for OIC prevention    #Leukocytosis  -Likely iso recent surgery, improving  -No infectious signs or symptoms  -Trend CBC daily    #NAGMA  -Mild, low c/f bicarb losses or RTA. No recent NS  -Trend BMP daily    #Hypocalcemia  -Normal (8.4) when corrected for albumin level    #HTN  -Pt attributed recent diagnosis of hypertension to anxiety about surgeries  -Continue Toprol 50mg qd (hold parameters SBP<110 and HR<60) to prevent rebound hypertension/tachycardia given pt has been adherent  -Outpatient f/u with PCP for further management    #Anxiety  -Continue buspar 10mg qd

## 2025-04-19 LAB
ANION GAP SERPL CALC-SCNC: 12 MMOL/L — SIGNIFICANT CHANGE UP (ref 7–14)
BASOPHILS # BLD AUTO: 0.05 K/UL — SIGNIFICANT CHANGE UP (ref 0–0.2)
BASOPHILS NFR BLD AUTO: 0.4 % — SIGNIFICANT CHANGE UP (ref 0–2)
BLD GP AB SCN SERPL QL: NEGATIVE — SIGNIFICANT CHANGE UP
BUN SERPL-MCNC: 10 MG/DL — SIGNIFICANT CHANGE UP (ref 7–23)
CALCIUM SERPL-MCNC: 8.8 MG/DL — SIGNIFICANT CHANGE UP (ref 8.4–10.5)
CHLORIDE SERPL-SCNC: 101 MMOL/L — SIGNIFICANT CHANGE UP (ref 98–107)
CO2 SERPL-SCNC: 23 MMOL/L — SIGNIFICANT CHANGE UP (ref 22–31)
CREAT SERPL-MCNC: 0.71 MG/DL — SIGNIFICANT CHANGE UP (ref 0.5–1.3)
EGFR: 107 ML/MIN/1.73M2 — SIGNIFICANT CHANGE UP
EGFR: 107 ML/MIN/1.73M2 — SIGNIFICANT CHANGE UP
EOSINOPHIL # BLD AUTO: 0.35 K/UL — SIGNIFICANT CHANGE UP (ref 0–0.5)
EOSINOPHIL NFR BLD AUTO: 3.1 % — SIGNIFICANT CHANGE UP (ref 0–6)
GLUCOSE SERPL-MCNC: 118 MG/DL — HIGH (ref 70–99)
HCT VFR BLD CALC: 40 % — SIGNIFICANT CHANGE UP (ref 34.5–45)
HGB BLD-MCNC: 12.5 G/DL — SIGNIFICANT CHANGE UP (ref 11.5–15.5)
IANC: 7.87 K/UL — HIGH (ref 1.8–7.4)
IMM GRANULOCYTES NFR BLD AUTO: 0.4 % — SIGNIFICANT CHANGE UP (ref 0–0.9)
LYMPHOCYTES # BLD AUTO: 2.38 K/UL — SIGNIFICANT CHANGE UP (ref 1–3.3)
LYMPHOCYTES # BLD AUTO: 20.9 % — SIGNIFICANT CHANGE UP (ref 13–44)
MCHC RBC-ENTMCNC: 24.4 PG — LOW (ref 27–34)
MCHC RBC-ENTMCNC: 31.3 G/DL — LOW (ref 32–36)
MCV RBC AUTO: 78.1 FL — LOW (ref 80–100)
MONOCYTES # BLD AUTO: 0.7 K/UL — SIGNIFICANT CHANGE UP (ref 0–0.9)
MONOCYTES NFR BLD AUTO: 6.1 % — SIGNIFICANT CHANGE UP (ref 2–14)
NEUTROPHILS # BLD AUTO: 7.87 K/UL — HIGH (ref 1.8–7.4)
NEUTROPHILS NFR BLD AUTO: 69.1 % — SIGNIFICANT CHANGE UP (ref 43–77)
NRBC # BLD AUTO: 0 K/UL — SIGNIFICANT CHANGE UP (ref 0–0)
NRBC # FLD: 0 K/UL — SIGNIFICANT CHANGE UP (ref 0–0)
NRBC BLD AUTO-RTO: 0 /100 WBCS — SIGNIFICANT CHANGE UP (ref 0–0)
PLATELET # BLD AUTO: 358 K/UL — SIGNIFICANT CHANGE UP (ref 150–400)
POTASSIUM SERPL-MCNC: 4.2 MMOL/L — SIGNIFICANT CHANGE UP (ref 3.5–5.3)
POTASSIUM SERPL-SCNC: 4.2 MMOL/L — SIGNIFICANT CHANGE UP (ref 3.5–5.3)
RBC # BLD: 5.12 M/UL — SIGNIFICANT CHANGE UP (ref 3.8–5.2)
RBC # FLD: 16.7 % — HIGH (ref 10.3–14.5)
RH IG SCN BLD-IMP: POSITIVE — SIGNIFICANT CHANGE UP
SODIUM SERPL-SCNC: 136 MMOL/L — SIGNIFICANT CHANGE UP (ref 135–145)
WBC # BLD: 11.39 K/UL — HIGH (ref 3.8–10.5)
WBC # FLD AUTO: 11.39 K/UL — HIGH (ref 3.8–10.5)

## 2025-04-19 PROCEDURE — 99233 SBSQ HOSP IP/OBS HIGH 50: CPT

## 2025-04-19 RX ORDER — MAGNESIUM HYDROXIDE 400 MG/5ML
30 SUSPENSION ORAL DAILY
Refills: 0 | Status: DISCONTINUED | OUTPATIENT
Start: 2025-04-19 | End: 2025-04-21

## 2025-04-19 RX ADMIN — Medication 975 MILLIGRAM(S): at 11:58

## 2025-04-19 RX ADMIN — OXYCODONE HYDROCHLORIDE 10 MILLIGRAM(S): 30 TABLET ORAL at 19:08

## 2025-04-19 RX ADMIN — Medication 3 MILLILITER(S): at 22:27

## 2025-04-19 RX ADMIN — Medication 4 MILLIGRAM(S): at 20:09

## 2025-04-19 RX ADMIN — Medication 4 MILLIGRAM(S): at 14:11

## 2025-04-19 RX ADMIN — HEPARIN SODIUM 5000 UNIT(S): 1000 INJECTION INTRAVENOUS; SUBCUTANEOUS at 06:02

## 2025-04-19 RX ADMIN — Medication 3 MILLILITER(S): at 00:16

## 2025-04-19 RX ADMIN — Medication 3 MILLILITER(S): at 13:00

## 2025-04-19 RX ADMIN — Medication 80 MILLIGRAM(S): at 03:48

## 2025-04-19 RX ADMIN — HEPARIN SODIUM 5000 UNIT(S): 1000 INJECTION INTRAVENOUS; SUBCUTANEOUS at 13:16

## 2025-04-19 RX ADMIN — OXYCODONE HYDROCHLORIDE 10 MILLIGRAM(S): 30 TABLET ORAL at 03:48

## 2025-04-19 RX ADMIN — OXYCODONE HYDROCHLORIDE 10 MILLIGRAM(S): 30 TABLET ORAL at 14:04

## 2025-04-19 RX ADMIN — Medication 975 MILLIGRAM(S): at 07:01

## 2025-04-19 RX ADMIN — OXYCODONE HYDROCHLORIDE 10 MILLIGRAM(S): 30 TABLET ORAL at 23:20

## 2025-04-19 RX ADMIN — Medication 80 MILLIGRAM(S): at 13:16

## 2025-04-19 RX ADMIN — Medication 975 MILLIGRAM(S): at 01:28

## 2025-04-19 RX ADMIN — OXYCODONE HYDROCHLORIDE 10 MILLIGRAM(S): 30 TABLET ORAL at 08:49

## 2025-04-19 RX ADMIN — Medication 975 MILLIGRAM(S): at 00:28

## 2025-04-19 RX ADMIN — OXYCODONE HYDROCHLORIDE 10 MILLIGRAM(S): 30 TABLET ORAL at 13:04

## 2025-04-19 RX ADMIN — Medication 975 MILLIGRAM(S): at 17:57

## 2025-04-19 RX ADMIN — Medication 975 MILLIGRAM(S): at 18:57

## 2025-04-19 RX ADMIN — MAGNESIUM HYDROXIDE 30 MILLILITER(S): 400 SUSPENSION ORAL at 17:57

## 2025-04-19 RX ADMIN — HEPARIN SODIUM 5000 UNIT(S): 1000 INJECTION INTRAVENOUS; SUBCUTANEOUS at 23:21

## 2025-04-19 RX ADMIN — Medication 975 MILLIGRAM(S): at 23:20

## 2025-04-19 RX ADMIN — OXYCODONE HYDROCHLORIDE 10 MILLIGRAM(S): 30 TABLET ORAL at 20:08

## 2025-04-19 RX ADMIN — OXYCODONE HYDROCHLORIDE 10 MILLIGRAM(S): 30 TABLET ORAL at 09:49

## 2025-04-19 RX ADMIN — Medication 80 MILLIGRAM(S): at 19:08

## 2025-04-19 RX ADMIN — OXYCODONE HYDROCHLORIDE 10 MILLIGRAM(S): 30 TABLET ORAL at 04:48

## 2025-04-19 RX ADMIN — Medication 975 MILLIGRAM(S): at 06:01

## 2025-04-19 RX ADMIN — Medication 80 MILLIGRAM(S): at 08:48

## 2025-04-19 RX ADMIN — Medication 3 MILLILITER(S): at 05:34

## 2025-04-19 NOTE — PROGRESS NOTE ADULT - ASSESSMENT
ASSESSMENT/PLAN:   CHELY ASCENCIO is a 44yFemale s/p Panniculectomy with rectus plication and PRS closure    - Continue to monitor drain OP  - JOSHUA care  - Okay for dispo per primary w/ close follow-up    Valentino Arriola MD   NS/[facility] Surgery Resident PGY1    For any questions, please DO NOT reach out via Teams.    Plastic Surgery   [facility]: [id number]  [facility]: [phone]

## 2025-04-19 NOTE — PROGRESS NOTE ADULT - SUBJECTIVE AND OBJECTIVE BOX
O/N Events:    Subjective/ROS: Patient seen and examined at bedside.     Denies Fever/Chills, HA, CP, SOB, n/v, changes in bowel/urinary habits.  12pt ROS otherwise negative.    VITALS  Vital Signs Last 24 Hrs  T(C): 36.6 (19 Apr 2025 05:19), Max: 37.3 (18 Apr 2025 17:00)  T(F): 97.9 (19 Apr 2025 05:19), Max: 99.1 (18 Apr 2025 17:00)  HR: 92 (19 Apr 2025 05:19) (75 - 100)  BP: 128/81 (19 Apr 2025 05:19) (98/56 - 145/97)  BP(mean): --  RR: 17 (19 Apr 2025 05:19) (16 - 18)  SpO2: 97% (19 Apr 2025 05:19) (95% - 100%)    Parameters below as of 19 Apr 2025 05:19  Patient On (Oxygen Delivery Method): room air        CAPILLARY BLOOD GLUCOSE          PHYSICAL EXAM  General: NAD  Head: NC/AT; MMM; PERRL; EOMI;  Neck: Supple; no JVD  Respiratory: CTAB; no wheezes/rales/rhonchi  Cardiovascular: Regular rhythm/rate; S1/S2+, no murmurs, rubs gallops   Gastrointestinal: Soft; NTND; bowel sounds normal and present  Extremities: WWP; no edema/cyanosis  Neurological: A&Ox3, CNII-XII grossly intact; no obvious focal deficits    MEDICATIONS  (STANDING):  acetaminophen     Tablet .. 975 milliGRAM(s) Oral every 6 hours  heparin   Injectable 5000 Unit(s) SubCutaneous every 8 hours  metoprolol succinate ER 50 milliGRAM(s) Oral daily  simethicone 80 milliGRAM(s) Chew every 6 hours  sodium chloride 0.9% lock flush 3 milliLiter(s) IV Push every 8 hours    MEDICATIONS  (PRN):  HYDROmorphone  Injectable 0.5 milliGRAM(s) IV Push every 10 minutes PRN Severe Pain (7 - 10)  ketorolac   Injectable 15 milliGRAM(s) IV Push every 6 hours PRN Mild Pain (1 - 3)  magnesium hydroxide Suspension 30 milliLiter(s) Oral daily PRN Constipation  ondansetron Injectable 4 milliGRAM(s) IV Push every 6 hours PRN Nausea and/or Vomiting  oxyCODONE    IR 5 milliGRAM(s) Oral every 4 hours PRN Moderate Pain (4 - 6)  oxyCODONE    IR 10 milliGRAM(s) Oral every 4 hours PRN Severe Pain (7 - 10)  senna 2 Tablet(s) Oral at bedtime PRN Constipation      No Known Drug Allergies  shrimp (Hives)      LABS                        12.5   11.39 )-----------( 358      ( 19 Apr 2025 03:58 )             40.0     04-19    136  |  101  |  10  ----------------------------<  118[H]  4.2   |  23  |  0.71    Ca    8.8      19 Apr 2025 03:58  Phos  4.1     04-17  Mg     1.80     04-17    TPro  6.9  /  Alb  3.7  /  TBili  0.2  /  DBili  x   /  AST  17  /  ALT  13  /  AlkPhos  63  04-17      Urinalysis Basic - ( 19 Apr 2025 03:58 )    Color: x / Appearance: x / SG: x / pH: x  Gluc: 118 mg/dL / Ketone: x  / Bili: x / Urobili: x   Blood: x / Protein: x / Nitrite: x   Leuk Esterase: x / RBC: x / WBC x   Sq Epi: x / Non Sq Epi: x / Bacteria: x              IMAGING/EKG/ETC   O/N Events: naeo    Subjective/ROS: Patient seen and examined at bedside.     Denies Fever/Chills, HA, CP, SOB, n/v, changes in bowel/urinary habits.  12pt ROS otherwise negative.    VITALS  Vital Signs Last 24 Hrs  T(C): 36.6 (19 Apr 2025 05:19), Max: 37.3 (18 Apr 2025 17:00)  T(F): 97.9 (19 Apr 2025 05:19), Max: 99.1 (18 Apr 2025 17:00)  HR: 92 (19 Apr 2025 05:19) (75 - 100)  BP: 128/81 (19 Apr 2025 05:19) (98/56 - 145/97)  BP(mean): --  RR: 17 (19 Apr 2025 05:19) (16 - 18)  SpO2: 97% (19 Apr 2025 05:19) (95% - 100%)    Parameters below as of 19 Apr 2025 05:19  Patient On (Oxygen Delivery Method): room air        CAPILLARY BLOOD GLUCOSE          PHYSICAL EXAM  General: NAD  Head: NC/AT; MMM; PERRL; EOMI;  Neck: Supple; no JVD  Respiratory: CTAB; no wheezes/rales/rhonchi  Cardiovascular: Regular rhythm/rate; S1/S2+, no murmurs, rubs gallops   Gastrointestinal: Soft; NTND; bowel sounds normal and present  Extremities: WWP; no edema/cyanosis  Neurological: A&Ox3, CNII-XII grossly intact; no obvious focal deficits    MEDICATIONS  (STANDING):  acetaminophen     Tablet .. 975 milliGRAM(s) Oral every 6 hours  heparin   Injectable 5000 Unit(s) SubCutaneous every 8 hours  metoprolol succinate ER 50 milliGRAM(s) Oral daily  simethicone 80 milliGRAM(s) Chew every 6 hours  sodium chloride 0.9% lock flush 3 milliLiter(s) IV Push every 8 hours    MEDICATIONS  (PRN):  HYDROmorphone  Injectable 0.5 milliGRAM(s) IV Push every 10 minutes PRN Severe Pain (7 - 10)  ketorolac   Injectable 15 milliGRAM(s) IV Push every 6 hours PRN Mild Pain (1 - 3)  magnesium hydroxide Suspension 30 milliLiter(s) Oral daily PRN Constipation  ondansetron Injectable 4 milliGRAM(s) IV Push every 6 hours PRN Nausea and/or Vomiting  oxyCODONE    IR 5 milliGRAM(s) Oral every 4 hours PRN Moderate Pain (4 - 6)  oxyCODONE    IR 10 milliGRAM(s) Oral every 4 hours PRN Severe Pain (7 - 10)  senna 2 Tablet(s) Oral at bedtime PRN Constipation      No Known Drug Allergies  shrimp (Hives)      LABS                        12.5   11.39 )-----------( 358      ( 19 Apr 2025 03:58 )             40.0     04-19    136  |  101  |  10  ----------------------------<  118[H]  4.2   |  23  |  0.71    Ca    8.8      19 Apr 2025 03:58  Phos  4.1     04-17  Mg     1.80     04-17    TPro  6.9  /  Alb  3.7  /  TBili  0.2  /  DBili  x   /  AST  17  /  ALT  13  /  AlkPhos  63  04-17      Urinalysis Basic - ( 19 Apr 2025 03:58 )    Color: x / Appearance: x / SG: x / pH: x  Gluc: 118 mg/dL / Ketone: x  / Bili: x / Urobili: x   Blood: x / Protein: x / Nitrite: x   Leuk Esterase: x / RBC: x / WBC x   Sq Epi: x / Non Sq Epi: x / Bacteria: x              IMAGING/EKG/ETC

## 2025-04-19 NOTE — PROGRESS NOTE ADULT - ASSESSMENT
44y female POD#2 s/p Ex-lap, Total abdominal hysterectomy with bilateral salpingectomy,  left ovarian cystectomy, partial omentectomy and  Lysis of pelvic adhesions for fibroid uterus and panniculectomy with rectus plication by plastic surgery. Pt progressing well post-operatively.    Neuro: Tylenol, Dilaudid PRN, Toradol PRN, Oxy PRN  CV: Hemodynamically stable  Hct: 43.1->36.1, f/u AM CBC  HTN - home Toprol per [facility] recs  Pulm: Saturating well on RA. Increase incentive spirometry.  GI: -JOSHUA drain in place- continue with drain care  Reg diet   Senna, Simethicone, Zofran  : Voiding spontaneously   Heme: Continue HSQ/ Venodynes for DVT ppx. Increase OOB.    ID: Afebrile  Endo: No active issues   Dispo: continue inpatient care    *INCOMPLETE NOTE*  Atilio Addison PGY2 44y female POD#2 s/p Ex-lap, Total abdominal hysterectomy with bilateral salpingectomy,  left ovarian cystectomy, partial omentectomy and  Lysis of pelvic adhesions for fibroid uterus and panniculectomy with rectus plication by plastic surgery. Pt progressing well post-operatively.    Neuro: Tylenol, Dilaudid PRN, Toradol PRN, Oxy PRN  CV: Hemodynamically stable  Hct: 43.1->36.1, f/u AM CBC  HTN - home Toprol per [facility] recs  Pulm: Saturating well on RA. Increase incentive spirometry.  GI: -JOSHUA drain in place- continue with drain care  Nausea overnight, resolved  Crackers overnight on ordered reg diet - pt to attempt to advance today  Senna, Simethicone, Zofran  : Voiding spontaneously   Heme: Continue HSQ/ Venodynes for DVT ppx. Increase OOB.    ID: Afebrile  Endo: No active issues   Dispo: continue inpatient care    *INCOMPLETE NOTE*  Atilio Addison PGY2 44y female POD#2 s/p Ex-lap, Total abdominal hysterectomy with bilateral salpingectomy,  left ovarian cystectomy, partial omentectomy and  Lysis of pelvic adhesions for fibroid uterus and panniculectomy with rectus plication by plastic surgery. Pt progressing well post-operatively.    Neuro: Tylenol, Dilaudid PRN, Toradol PRN, Oxy PRN  CV: Hemodynamically stable  Hct: 43.1->36.1, f/u AM CBC  HTN - home Toprol per [facility] recs  Pulm: Saturating well on RA. Increase incentive spirometry.  GI: -JOSHUA drain in place- continue with drain care  Nausea overnight, resolved  Crackers overnight on ordered reg diet - pt to attempt to advance today  not yet passing flatus - continue simethicone, encourage ambulation, hot liquids, and Milk of Mag  Senna (pt does not like Senna), Simethicone, Zofran  : Voiding spontaneously   Heme: Continue HSQ/ Venodynes for DVT ppx. Increase OOB.    ID: Afebrile  Endo: No active issues   Dispo: continue inpatient care    *INCOMPLETE NOTE*  Atilio Addison PGY2

## 2025-04-19 NOTE — PROGRESS NOTE ADULT - ASSESSMENT
45 y/o F with recent diagnosis of HTN, anxiety, fibroid uterus who is s/p Ex-lap, Total abdominal hysterectomy with bilateral salpingectomy,  left ovarian cystectomy, partial omentectomy and  Lysis of pelvic adhesions for fibroid uterus and panniculectomy with rectus plication by plastic surgery on 4/17    #Fibroid uterus  MR abdomen and pelvis reviewed  -s/p hysterectomy on 4/17  -Pain management per primary  -Recommend stopping senna given pt reported abdominal cramping  -Pt requesting colace, can add miralax PRN as well for OIC prevention    #Leukocytosis  -Likely iso recent surgery, improving  -No infectious signs or symptoms  -Trend CBC daily    #HTN  -Pt attributed recent diagnosis of hypertension to anxiety about surgeries  -Continue Toprol 50mg qd (hold parameters SBP<110 and HR<60) to prevent rebound hypertension/tachycardia given pt has been adherent  -Outpatient f/u with PCP for further management    #Anxiety  -Continue buspar 10mg qd    #NAGMA RESOLVED  -Mild, low c/f bicarb losses or RTA. No recent NS  -Trend BMP daily    #Hypocalcemia RESOLVED  -Normal (8.4) when corrected for albumin level     43 y/o F with recent diagnosis of HTN, anxiety, fibroid uterus who is s/p Ex-lap, Total abdominal hysterectomy with bilateral salpingectomy,  left ovarian cystectomy, partial omentectomy and  Lysis of pelvic adhesions for fibroid uterus and panniculectomy with rectus plication by plastic surgery on 4/17    #Fibroid uterus  MR abdomen and pelvis reviewed  -s/p hysterectomy on 4/17  -Pain management per primary  -Recommend stopping senna given pt reported abdominal cramping  - WOULD ADD MIRALAX for OIC prevention    #Leukocytosis  -Likely iso recent surgery, improving  -No infectious signs or symptoms  -Trend CBC daily    #HTN  -Pt attributed recent diagnosis of hypertension to anxiety about surgeries  - would discontinue metoprolol and monitor BP  -Outpatient f/u with PCP for further management    #Belching  patient w/ belching since surgery  - would trial Tums    #Anxiety  -Continue buspar 10mg qd    #NAGMA RESOLVED  -Mild, low c/f bicarb losses or RTA. No recent NS  -Trend BMP daily    #Hypocalcemia RESOLVED  -Normal (8.4) when corrected for albumin level     43 y/o F with recent diagnosis of HTN, anxiety, fibroid uterus who is s/p Ex-lap, Total abdominal hysterectomy with bilateral salpingectomy,  left ovarian cystectomy, partial omentectomy and  Lysis of pelvic adhesions for fibroid uterus and panniculectomy with rectus plication by plastic surgery on 4/17    #Fibroid uterus  MR abdomen and pelvis reviewed  -s/p hysterectomy on 4/17  -Pain management per primary  -Recommend stopping senna given pt reported abdominal cramping  - WOULD ADD MIRALAX for OIC prevention    #Leukocytosis  -Likely iso recent surgery, improving  -No infectious signs or symptoms  -Trend CBC daily    #HTN  -Pt attributed recent diagnosis of hypertension to anxiety about surgeries  - would discontinue metoprolol and monitor BP  -Outpatient f/u with PCP for further management    #Belching  #Lack of Flatulence  patient w/ belching since surgery  - would trial Tums  - would obtain xray abdomen i/s/o surgery and opioid use to rule of bowel obstructive vs Jakin     #Anxiety  -Continue buspar 10mg qd    #NAGMA RESOLVED  -Mild, low c/f bicarb losses or RTA. No recent NS  -Trend BMP daily    #Hypocalcemia RESOLVED  -Normal (8.4) when corrected for albumin level

## 2025-04-19 NOTE — PROGRESS NOTE ADULT - TIME BILLING
Time-based billing (NON-critical care).     more than 50% of the visit was spent counseling and / or coordinating care by the attending physician.  The necessity of the time spent during the encounter on this date of service was due to:     review of laboratory data, radiology results, consultants' recommendations, documentation in [facility], discussion with patient/ACP and interdisciplinary staff (such as , social workers, etc). Interventions were performed as documented above.

## 2025-04-19 NOTE — PROGRESS NOTE ADULT - SUBJECTIVE AND OBJECTIVE BOX
R2 Gyn ONC Progress Note POD#2  HD#3    Subjective:   Pt seen and examined at bedside. No events overnight. Pain well controlled. Patient ambulating. Passing flatus. Tolerating regular diet. Pt denies fever, chills, chest pain, SOB, nausea, vomiting, lightheadedness, dizziness.      Objective:  T(F): 97.9 (04-19-25 @ 05:19), Max: 99.1 (04-18-25 @ 17:00)  HR: 92 (04-19-25 @ 05:19) (75 - 100)  BP: 128/81 (04-19-25 @ 05:19) (98/56 - 145/97)  RR: 17 (04-19-25 @ 05:19) (16 - 18)  SpO2: 97% (04-19-25 @ 05:19) (95% - 100%)  Wt(kg): --  I&O's Summary    17 Apr 2025 07:01  -  18 Apr 2025 07:00  --------------------------------------------------------  IN: 2930 mL / OUT: 1874.5 mL / NET: 1055.5 mL    18 Apr 2025 07:01  -  19 Apr 2025 05:55  --------------------------------------------------------  IN: 1240 mL / OUT: 1417 mL / NET: -177 mL      CAPILLARY BLOOD GLUCOSE          MEDICATIONS  (STANDING):  acetaminophen     Tablet .. 975 milliGRAM(s) Oral every 6 hours  heparin   Injectable 5000 Unit(s) SubCutaneous every 8 hours  metoprolol succinate ER 50 milliGRAM(s) Oral daily  simethicone 80 milliGRAM(s) Chew every 6 hours  sodium chloride 0.9% lock flush 3 milliLiter(s) IV Push every 8 hours    MEDICATIONS  (PRN):  HYDROmorphone  Injectable 0.5 milliGRAM(s) IV Push every 10 minutes PRN Severe Pain (7 - 10)  ketorolac   Injectable 15 milliGRAM(s) IV Push every 6 hours PRN Mild Pain (1 - 3)  ondansetron Injectable 4 milliGRAM(s) IV Push every 6 hours PRN Nausea and/or Vomiting  oxyCODONE    IR 5 milliGRAM(s) Oral every 4 hours PRN Moderate Pain (4 - 6)  oxyCODONE    IR 10 milliGRAM(s) Oral every 4 hours PRN Severe Pain (7 - 10)  senna 2 Tablet(s) Oral at bedtime PRN Constipation      Physical Exam:  Constitutional: NAD  Pulmonary: clear to auscultation bilaterally   Cardiovascular: Regular rate and rhythm   Abdomen: soft, appropriately-tender, non-distended, normal bowel signs, incision clean, dry and intact  JOSHUA drains with serosanguinous output   Extremities: no lower extremity edema or calve tenderness    LABS:               12.5   11.39 )-----------( 358      ( 04-19 @ 03:58 )             40.0                11.8   11.19 )-----------( 342      ( 04-18 @ 04:41 )             36.1                14.0   17.94 )-----------( 341      ( 04-17 @ 15:16 )             43.1       04-19    136    |  101    |  10     ----------------------------<  118[H]  4.2     |  23     |  0.71   04-18    136    |  103    |  11     ----------------------------<  95     4.2     |  21[L]  |  0.73     Ca    8.8        19 Apr 2025 03:58  Ca    8.2[L]      18 Apr 2025 04:41            Urinalysis Basic - ( 19 Apr 2025 03:58 )    Color: x / Appearance: x / SG: x / pH: x  Gluc: 118 mg/dL / Ketone: x  / Bili: x / Urobili: x   Blood: x / Protein: x / Nitrite: x   Leuk Esterase: x / RBC: x / WBC x   Sq Epi: x / Non Sq Epi: x / Bacteria: x                 R2 Gyn ONC Progress Note POD#2  HD#3    Subjective:   Pt seen and examined at bedside. No events overnight. Nausea earlier overnight but this resolved. Denies vomiting. Pain well controlled. Patient ambulating. Passing flatus. Tolerating crackers. Pt denies fever, chills, chest pain, SOB, lightheadedness, dizziness.      Objective:  T(F): 97.9 (04-19-25 @ 05:19), Max: 99.1 (04-18-25 @ 17:00)  HR: 92 (04-19-25 @ 05:19) (75 - 100)  BP: 128/81 (04-19-25 @ 05:19) (98/56 - 145/97)  RR: 17 (04-19-25 @ 05:19) (16 - 18)  SpO2: 97% (04-19-25 @ 05:19) (95% - 100%)  Wt(kg): --  I&O's Summary    17 Apr 2025 07:01  -  18 Apr 2025 07:00  --------------------------------------------------------  IN: 2930 mL / OUT: 1874.5 mL / NET: 1055.5 mL    18 Apr 2025 07:01  -  19 Apr 2025 05:55  --------------------------------------------------------  IN: 1240 mL / OUT: 1417 mL / NET: -177 mL      CAPILLARY BLOOD GLUCOSE          MEDICATIONS  (STANDING):  acetaminophen     Tablet .. 975 milliGRAM(s) Oral every 6 hours  heparin   Injectable 5000 Unit(s) SubCutaneous every 8 hours  metoprolol succinate ER 50 milliGRAM(s) Oral daily  simethicone 80 milliGRAM(s) Chew every 6 hours  sodium chloride 0.9% lock flush 3 milliLiter(s) IV Push every 8 hours    MEDICATIONS  (PRN):  HYDROmorphone  Injectable 0.5 milliGRAM(s) IV Push every 10 minutes PRN Severe Pain (7 - 10)  ketorolac   Injectable 15 milliGRAM(s) IV Push every 6 hours PRN Mild Pain (1 - 3)  ondansetron Injectable 4 milliGRAM(s) IV Push every 6 hours PRN Nausea and/or Vomiting  oxyCODONE    IR 5 milliGRAM(s) Oral every 4 hours PRN Moderate Pain (4 - 6)  oxyCODONE    IR 10 milliGRAM(s) Oral every 4 hours PRN Severe Pain (7 - 10)  senna 2 Tablet(s) Oral at bedtime PRN Constipation      Physical Exam:  Constitutional: NAD  Pulmonary: clear to auscultation bilaterally   Cardiovascular: Regular rate and rhythm   Abdomen: soft, appropriately-tender, non-distended, normal bowel signs, incision clean, dry and intact  JOSHUA drains with serosanguinous output   Extremities: no lower extremity edema or calve tenderness    LABS:               12.5   11.39 )-----------( 358      ( 04-19 @ 03:58 )             40.0                11.8   11.19 )-----------( 342      ( 04-18 @ 04:41 )             36.1                14.0   17.94 )-----------( 341      ( 04-17 @ 15:16 )             43.1       04-19    136    |  101    |  10     ----------------------------<  118[H]  4.2     |  23     |  0.71   04-18    136    |  103    |  11     ----------------------------<  95     4.2     |  21[L]  |  0.73     Ca    8.8        19 Apr 2025 03:58  Ca    8.2[L]      18 Apr 2025 04:41            Urinalysis Basic - ( 19 Apr 2025 03:58 )    Color: x / Appearance: x / SG: x / pH: x  Gluc: 118 mg/dL / Ketone: x  / Bili: x / Urobili: x   Blood: x / Protein: x / Nitrite: x   Leuk Esterase: x / RBC: x / WBC x   Sq Epi: x / Non Sq Epi: x / Bacteria: x                 R2 Gyn ONC Progress Note POD#2  HD#3    Subjective:   Pt seen and examined at bedside. No events overnight. Nausea earlier overnight but this resolved. Denies vomiting. Pain well controlled. Patient ambulating. Not yet passing flatus. Tolerating crackers. Pt denies fever, chills, chest pain, SOB, lightheadedness, dizziness.      Objective:  T(F): 97.9 (04-19-25 @ 05:19), Max: 99.1 (04-18-25 @ 17:00)  HR: 92 (04-19-25 @ 05:19) (75 - 100)  BP: 128/81 (04-19-25 @ 05:19) (98/56 - 145/97)  RR: 17 (04-19-25 @ 05:19) (16 - 18)  SpO2: 97% (04-19-25 @ 05:19) (95% - 100%)  Wt(kg): --  I&O's Summary    17 Apr 2025 07:01  -  18 Apr 2025 07:00  --------------------------------------------------------  IN: 2930 mL / OUT: 1874.5 mL / NET: 1055.5 mL    18 Apr 2025 07:01  -  19 Apr 2025 05:55  --------------------------------------------------------  IN: 1240 mL / OUT: 1417 mL / NET: -177 mL      CAPILLARY BLOOD GLUCOSE          MEDICATIONS  (STANDING):  acetaminophen     Tablet .. 975 milliGRAM(s) Oral every 6 hours  heparin   Injectable 5000 Unit(s) SubCutaneous every 8 hours  metoprolol succinate ER 50 milliGRAM(s) Oral daily  simethicone 80 milliGRAM(s) Chew every 6 hours  sodium chloride 0.9% lock flush 3 milliLiter(s) IV Push every 8 hours    MEDICATIONS  (PRN):  HYDROmorphone  Injectable 0.5 milliGRAM(s) IV Push every 10 minutes PRN Severe Pain (7 - 10)  ketorolac   Injectable 15 milliGRAM(s) IV Push every 6 hours PRN Mild Pain (1 - 3)  ondansetron Injectable 4 milliGRAM(s) IV Push every 6 hours PRN Nausea and/or Vomiting  oxyCODONE    IR 5 milliGRAM(s) Oral every 4 hours PRN Moderate Pain (4 - 6)  oxyCODONE    IR 10 milliGRAM(s) Oral every 4 hours PRN Severe Pain (7 - 10)  senna 2 Tablet(s) Oral at bedtime PRN Constipation      Physical Exam:  Constitutional: NAD  Pulmonary: clear to auscultation bilaterally   Cardiovascular: Regular rate and rhythm   Abdomen: soft, appropriately-tender, non-distended, normal bowel signs, incision clean, dry and intact  JOSHUA drains with serosanguinous output   Extremities: no lower extremity edema or calve tenderness    LABS:               12.5   11.39 )-----------( 358      ( 04-19 @ 03:58 )             40.0                11.8   11.19 )-----------( 342      ( 04-18 @ 04:41 )             36.1                14.0   17.94 )-----------( 341      ( 04-17 @ 15:16 )             43.1       04-19    136    |  101    |  10     ----------------------------<  118[H]  4.2     |  23     |  0.71   04-18    136    |  103    |  11     ----------------------------<  95     4.2     |  21[L]  |  0.73     Ca    8.8        19 Apr 2025 03:58  Ca    8.2[L]      18 Apr 2025 04:41            Urinalysis Basic - ( 19 Apr 2025 03:58 )    Color: x / Appearance: x / SG: x / pH: x  Gluc: 118 mg/dL / Ketone: x  / Bili: x / Urobili: x   Blood: x / Protein: x / Nitrite: x   Leuk Esterase: x / RBC: x / WBC x   Sq Epi: x / Non Sq Epi: x / Bacteria: x

## 2025-04-19 NOTE — PROGRESS NOTE ADULT - SUBJECTIVE AND OBJECTIVE BOX
Plastic Surgery Progress Note (pg [facility]: [id number], [facility]: [phone])    SUBJECTIVE  The patient was seen and examined. No acute events overnight. Pain controlled, afebrile w/ stable vitals.     OBJECTIVE  ___________________________________________________  VITAL SIGNS / I&O's   Vital Signs Last 24 Hrs  T(C): 36.6 (19 Apr 2025 05:19), Max: 37.3 (18 Apr 2025 17:00)  T(F): 97.9 (19 Apr 2025 05:19), Max: 99.1 (18 Apr 2025 17:00)  HR: 92 (19 Apr 2025 05:19) (75 - 100)  BP: 128/81 (19 Apr 2025 05:19) (98/56 - 145/97)  BP(mean): --  RR: 17 (19 Apr 2025 05:19) (16 - 18)  SpO2: 97% (19 Apr 2025 05:19) (95% - 100%)    Parameters below as of 19 Apr 2025 05:19  Patient On (Oxygen Delivery Method): room air          18 Apr 2025 07:01  -  19 Apr 2025 07:00  --------------------------------------------------------  IN:    Oral Fluid: 1240 mL  Total IN: 1240 mL    OUT:    Bulb (mL): 25 mL    Bulb (mL): 42 mL    Indwelling Catheter - Urethral (mL): 100 mL    Voided (mL): 1250 mL  Total OUT: 1417 mL    Total NET: -177 mL        ___________________________________________________  PHYSICAL EXAM  -- CONSTITUTIONAL: NAD, lying in bed  -- NEURO: Awake, alert  -- Abdominal incisions well-appearing, soft and compressible with appropriate incisional TTP. Tissue all healthy and viable appearing. Drains with SS OP    ___________________________________________________  LABS                        12.5   11.39 )-----------( 358      ( 19 Apr 2025 03:58 )             40.0     19 Apr 2025 03:58    136    |  101    |  10     ----------------------------<  118    4.2     |  23     |  0.71     Ca    8.8        19 Apr 2025 03:58  Phos  4.1       17 Apr 2025 15:16  Mg     1.80      17 Apr 2025 15:16    TPro  6.9    /  Alb  3.7    /  TBili  0.2    /  DBili  x      /  AST  17     /  ALT  13     /  AlkPhos  63     17 Apr 2025 15:16      CAPILLARY BLOOD GLUCOSE            Urinalysis Basic - ( 19 Apr 2025 03:58 )    Color: x / Appearance: x / SG: x / pH: x  Gluc: 118 mg/dL / Ketone: x  / Bili: x / Urobili: x   Blood: x / Protein: x / Nitrite: x   Leuk Esterase: x / RBC: x / WBC x   Sq Epi: x / Non Sq Epi: x / Bacteria: x      ___________________________________________________  MICRO  Recent Cultures:    ___________________________________________________  MEDICATIONS  (STANDING):  acetaminophen     Tablet .. 975 milliGRAM(s) Oral every 6 hours  heparin   Injectable 5000 Unit(s) SubCutaneous every 8 hours  metoprolol succinate ER 50 milliGRAM(s) Oral daily  simethicone 80 milliGRAM(s) Chew every 6 hours  sodium chloride 0.9% lock flush 3 milliLiter(s) IV Push every 8 hours    MEDICATIONS  (PRN):  HYDROmorphone  Injectable 0.5 milliGRAM(s) IV Push every 10 minutes PRN Severe Pain (7 - 10)  ketorolac   Injectable 15 milliGRAM(s) IV Push every 6 hours PRN Mild Pain (1 - 3)  ondansetron Injectable 4 milliGRAM(s) IV Push every 6 hours PRN Nausea and/or Vomiting  oxyCODONE    IR 5 milliGRAM(s) Oral every 4 hours PRN Moderate Pain (4 - 6)  oxyCODONE    IR 10 milliGRAM(s) Oral every 4 hours PRN Severe Pain (7 - 10)  senna 2 Tablet(s) Oral at bedtime PRN Constipation

## 2025-04-20 LAB
ANION GAP SERPL CALC-SCNC: 12 MMOL/L — SIGNIFICANT CHANGE UP (ref 7–14)
BUN SERPL-MCNC: 11 MG/DL — SIGNIFICANT CHANGE UP (ref 7–23)
CALCIUM SERPL-MCNC: 8.6 MG/DL — SIGNIFICANT CHANGE UP (ref 8.4–10.5)
CHLORIDE SERPL-SCNC: 101 MMOL/L — SIGNIFICANT CHANGE UP (ref 98–107)
CO2 SERPL-SCNC: 22 MMOL/L — SIGNIFICANT CHANGE UP (ref 22–31)
CREAT SERPL-MCNC: 0.61 MG/DL — SIGNIFICANT CHANGE UP (ref 0.5–1.3)
EGFR: 113 ML/MIN/1.73M2 — SIGNIFICANT CHANGE UP
EGFR: 113 ML/MIN/1.73M2 — SIGNIFICANT CHANGE UP
GLUCOSE SERPL-MCNC: 88 MG/DL — SIGNIFICANT CHANGE UP (ref 70–99)
HCT VFR BLD CALC: 38 % — SIGNIFICANT CHANGE UP (ref 34.5–45)
HGB BLD-MCNC: 11.9 G/DL — SIGNIFICANT CHANGE UP (ref 11.5–15.5)
MCHC RBC-ENTMCNC: 24.7 PG — LOW (ref 27–34)
MCHC RBC-ENTMCNC: 31.3 G/DL — LOW (ref 32–36)
MCV RBC AUTO: 79 FL — LOW (ref 80–100)
NRBC # BLD AUTO: 0 K/UL — SIGNIFICANT CHANGE UP (ref 0–0)
NRBC # FLD: 0 K/UL — SIGNIFICANT CHANGE UP (ref 0–0)
NRBC BLD AUTO-RTO: 0 /100 WBCS — SIGNIFICANT CHANGE UP (ref 0–0)
PLATELET # BLD AUTO: 340 K/UL — SIGNIFICANT CHANGE UP (ref 150–400)
POTASSIUM SERPL-MCNC: 4.1 MMOL/L — SIGNIFICANT CHANGE UP (ref 3.5–5.3)
POTASSIUM SERPL-SCNC: 4.1 MMOL/L — SIGNIFICANT CHANGE UP (ref 3.5–5.3)
RBC # BLD: 4.81 M/UL — SIGNIFICANT CHANGE UP (ref 3.8–5.2)
RBC # FLD: 16.4 % — HIGH (ref 10.3–14.5)
SODIUM SERPL-SCNC: 135 MMOL/L — SIGNIFICANT CHANGE UP (ref 135–145)
WBC # BLD: 11.11 K/UL — HIGH (ref 3.8–10.5)
WBC # FLD AUTO: 11.11 K/UL — HIGH (ref 3.8–10.5)

## 2025-04-20 PROCEDURE — 99232 SBSQ HOSP IP/OBS MODERATE 35: CPT

## 2025-04-20 RX ORDER — METOCLOPRAMIDE HCL 10 MG
10 TABLET ORAL ONCE
Refills: 0 | Status: COMPLETED | OUTPATIENT
Start: 2025-04-20 | End: 2025-04-20

## 2025-04-20 RX ORDER — POLYETHYLENE GLYCOL 3350 17 G/17G
17 POWDER, FOR SOLUTION ORAL DAILY
Refills: 0 | Status: DISCONTINUED | OUTPATIENT
Start: 2025-04-20 | End: 2025-04-21

## 2025-04-20 RX ADMIN — OXYCODONE HYDROCHLORIDE 10 MILLIGRAM(S): 30 TABLET ORAL at 21:53

## 2025-04-20 RX ADMIN — OXYCODONE HYDROCHLORIDE 10 MILLIGRAM(S): 30 TABLET ORAL at 22:30

## 2025-04-20 RX ADMIN — Medication 20 MILLIGRAM(S): at 22:01

## 2025-04-20 RX ADMIN — METOPROLOL SUCCINATE 50 MILLIGRAM(S): 50 TABLET, EXTENDED RELEASE ORAL at 06:37

## 2025-04-20 RX ADMIN — Medication 975 MILLIGRAM(S): at 06:37

## 2025-04-20 RX ADMIN — OXYCODONE HYDROCHLORIDE 5 MILLIGRAM(S): 30 TABLET ORAL at 08:07

## 2025-04-20 RX ADMIN — Medication 975 MILLIGRAM(S): at 17:19

## 2025-04-20 RX ADMIN — Medication 3 MILLILITER(S): at 06:53

## 2025-04-20 RX ADMIN — OXYCODONE HYDROCHLORIDE 5 MILLIGRAM(S): 30 TABLET ORAL at 07:05

## 2025-04-20 RX ADMIN — POLYETHYLENE GLYCOL 3350 17 GRAM(S): 17 POWDER, FOR SOLUTION ORAL at 12:09

## 2025-04-20 RX ADMIN — Medication 20 MILLIGRAM(S): at 10:25

## 2025-04-20 RX ADMIN — Medication 3 MILLILITER(S): at 22:01

## 2025-04-20 RX ADMIN — Medication 4 MILLIGRAM(S): at 07:08

## 2025-04-20 RX ADMIN — OXYCODONE HYDROCHLORIDE 5 MILLIGRAM(S): 30 TABLET ORAL at 12:41

## 2025-04-20 RX ADMIN — Medication 10 MILLIGRAM(S): at 12:41

## 2025-04-20 RX ADMIN — Medication 80 MILLIGRAM(S): at 21:52

## 2025-04-20 RX ADMIN — Medication 80 MILLIGRAM(S): at 02:07

## 2025-04-20 RX ADMIN — Medication 80 MILLIGRAM(S): at 09:57

## 2025-04-20 RX ADMIN — HEPARIN SODIUM 5000 UNIT(S): 1000 INJECTION INTRAVENOUS; SUBCUTANEOUS at 14:14

## 2025-04-20 RX ADMIN — OXYCODONE HYDROCHLORIDE 10 MILLIGRAM(S): 30 TABLET ORAL at 00:20

## 2025-04-20 RX ADMIN — Medication 80 MILLIGRAM(S): at 14:14

## 2025-04-20 RX ADMIN — Medication 3 MILLILITER(S): at 14:27

## 2025-04-20 RX ADMIN — Medication 975 MILLIGRAM(S): at 12:09

## 2025-04-20 NOTE — PROGRESS NOTE ADULT - SUBJECTIVE AND OBJECTIVE BOX
R2 Gyn ONC Progress Note POD#3  HD#4    Subjective:   Pt seen and examined at bedside. No events overnight. Pain well controlled. Patient ambulating. Passing flatus. Tolerating regular diet. Pt denies fever, chills, chest pain, SOB, nausea, vomiting, lightheadedness, dizziness.      Objective:  T(F): 97.9 (04-20-25 @ 04:38), Max: 99.1 (04-19-25 @ 17:45)  HR: 79 (04-20-25 @ 04:38) (79 - 110)  BP: 122/79 (04-20-25 @ 04:38) (112/67 - 151/95)  RR: 16 (04-20-25 @ 04:38) (16 - 18)  SpO2: 97% (04-20-25 @ 04:38) (95% - 100%)  Wt(kg): --  I&O's Summary    18 Apr 2025 07:01  -  19 Apr 2025 07:00  --------------------------------------------------------  IN: 1240 mL / OUT: 1547 mL / NET: -307 mL    19 Apr 2025 07:01  -  20 Apr 2025 05:43  --------------------------------------------------------  IN: 960 mL / OUT: 1427 mL / NET: -467 mL      CAPILLARY BLOOD GLUCOSE          MEDICATIONS  (STANDING):  acetaminophen     Tablet .. 975 milliGRAM(s) Oral every 6 hours  heparin   Injectable 5000 Unit(s) SubCutaneous every 8 hours  metoprolol succinate ER 50 milliGRAM(s) Oral daily  simethicone 80 milliGRAM(s) Chew every 6 hours  sodium chloride 0.9% lock flush 3 milliLiter(s) IV Push every 8 hours    MEDICATIONS  (PRN):  HYDROmorphone  Injectable 0.5 milliGRAM(s) IV Push every 10 minutes PRN Severe Pain (7 - 10)  ketorolac   Injectable 15 milliGRAM(s) IV Push every 6 hours PRN Mild Pain (1 - 3)  magnesium hydroxide Suspension 30 milliLiter(s) Oral daily PRN Constipation  ondansetron Injectable 4 milliGRAM(s) IV Push every 6 hours PRN Nausea and/or Vomiting  oxyCODONE    IR 5 milliGRAM(s) Oral every 4 hours PRN Moderate Pain (4 - 6)  oxyCODONE    IR 10 milliGRAM(s) Oral every 4 hours PRN Severe Pain (7 - 10)  senna 2 Tablet(s) Oral at bedtime PRN Constipation      Physical Exam:  Constitutional: NAD  Pulmonary: clear to auscultation bilaterally   Cardiovascular: Regular rate and rhythm   Abdomen: soft, appropriately-tender, non-distended, normal bowel signs, incision clean, dry and intact  JOSHUA drains with serosanguinous output   Extremities: no lower extremity edema or calve tenderness    LABS:               12.5   11.39 )-----------( 358      ( 04-19 @ 03:58 )             40.0                11.8   11.19 )-----------( 342      ( 04-18 @ 04:41 )             36.1                14.0   17.94 )-----------( 341      ( 04-17 @ 15:16 )             43.1       04-19    136    |  101    |  10     ----------------------------<  118[H]  4.2     |  23     |  0.71     Ca    8.8        19 Apr 2025 03:58            Urinalysis Basic - ( 19 Apr 2025 03:58 )    Color: x / Appearance: x / SG: x / pH: x  Gluc: 118 mg/dL / Ketone: x  / Bili: x / Urobili: x   Blood: x / Protein: x / Nitrite: x   Leuk Esterase: x / RBC: x / WBC x   Sq Epi: x / Non Sq Epi: x / Bacteria: x                 R2 Gyn ONC Progress Note POD#3  HD#4    Subjective:   Pt seen and examined at bedside. Pt reports vomiting x1 overnight with oxycodone, but notes she normally becomes nauseous with oxy. Has not vomited since. Tolerating soup but has not attempted to further advance diet. Pain well controlled. Patient ambulating. Began passing flatus this AM. Burping. Pt denies fever, chills, chest pain, SOB, nausea, vomiting, lightheadedness, dizziness.      Objective:  T(F): 97.9 (04-20-25 @ 04:38), Max: 99.1 (04-19-25 @ 17:45)  HR: 79 (04-20-25 @ 04:38) (79 - 110)  BP: 122/79 (04-20-25 @ 04:38) (112/67 - 151/95)  RR: 16 (04-20-25 @ 04:38) (16 - 18)  SpO2: 97% (04-20-25 @ 04:38) (95% - 100%)  Wt(kg): --  I&O's Summary    18 Apr 2025 07:01  -  19 Apr 2025 07:00  --------------------------------------------------------  IN: 1240 mL / OUT: 1547 mL / NET: -307 mL    19 Apr 2025 07:01  -  20 Apr 2025 05:43  --------------------------------------------------------  IN: 960 mL / OUT: 1427 mL / NET: -467 mL      CAPILLARY BLOOD GLUCOSE          MEDICATIONS  (STANDING):  acetaminophen     Tablet .. 975 milliGRAM(s) Oral every 6 hours  heparin   Injectable 5000 Unit(s) SubCutaneous every 8 hours  metoprolol succinate ER 50 milliGRAM(s) Oral daily  simethicone 80 milliGRAM(s) Chew every 6 hours  sodium chloride 0.9% lock flush 3 milliLiter(s) IV Push every 8 hours    MEDICATIONS  (PRN):  HYDROmorphone  Injectable 0.5 milliGRAM(s) IV Push every 10 minutes PRN Severe Pain (7 - 10)  ketorolac   Injectable 15 milliGRAM(s) IV Push every 6 hours PRN Mild Pain (1 - 3)  magnesium hydroxide Suspension 30 milliLiter(s) Oral daily PRN Constipation  ondansetron Injectable 4 milliGRAM(s) IV Push every 6 hours PRN Nausea and/or Vomiting  oxyCODONE    IR 5 milliGRAM(s) Oral every 4 hours PRN Moderate Pain (4 - 6)  oxyCODONE    IR 10 milliGRAM(s) Oral every 4 hours PRN Severe Pain (7 - 10)  senna 2 Tablet(s) Oral at bedtime PRN Constipation      Physical Exam:  Constitutional: NAD  Pulmonary: clear to auscultation bilaterally   Cardiovascular: Regular rate and rhythm   Abdomen: soft, appropriately-tender, mildly-distended, normal bowel signs, incision clean, dry and intact  JOSHUA drains with serosanguinous output   Extremities: no lower extremity edema or calve tenderness    LABS:               12.5   11.39 )-----------( 358      ( 04-19 @ 03:58 )             40.0                11.8   11.19 )-----------( 342      ( 04-18 @ 04:41 )             36.1                14.0   17.94 )-----------( 341      ( 04-17 @ 15:16 )             43.1       04-19    136    |  101    |  10     ----------------------------<  118[H]  4.2     |  23     |  0.71     Ca    8.8        19 Apr 2025 03:58            Urinalysis Basic - ( 19 Apr 2025 03:58 )    Color: x / Appearance: x / SG: x / pH: x  Gluc: 118 mg/dL / Ketone: x  / Bili: x / Urobili: x   Blood: x / Protein: x / Nitrite: x   Leuk Esterase: x / RBC: x / WBC x   Sq Epi: x / Non Sq Epi: x / Bacteria: x

## 2025-04-20 NOTE — PROGRESS NOTE ADULT - SUBJECTIVE AND OBJECTIVE BOX
Plastic Surgery Progress Note (pg [facility]: [id number], [facility]: [phone])    SUBJECTIVE  The patient was seen and examined. No acute events overnight.    OBJECTIVE  ___________________________________________________  VITAL SIGNS / I&O's   Vital Signs Last 24 Hrs  T(C): 36.6 (20 Apr 2025 04:38), Max: 37.3 (19 Apr 2025 17:45)  T(F): 97.9 (20 Apr 2025 04:38), Max: 99.1 (19 Apr 2025 17:45)  HR: 79 (20 Apr 2025 04:38) (79 - 110)  BP: 122/79 (20 Apr 2025 04:38) (112/67 - 151/95)  BP(mean): --  RR: 16 (20 Apr 2025 04:38) (16 - 18)  SpO2: 97% (20 Apr 2025 04:38) (95% - 100%)    Parameters below as of 20 Apr 2025 04:38  Patient On (Oxygen Delivery Method): room air          19 Apr 2025 07:01  -  20 Apr 2025 07:00  --------------------------------------------------------  IN:    Oral Fluid: 960 mL  Total IN: 960 mL    OUT:    Bulb (mL): 30 mL    Bulb (mL): 77 mL    Voided (mL): 1320 mL  Total OUT: 1427 mL    Total NET: -467 mL        ___________________________________________________  PHYSICAL EXAM  -- CONSTITUTIONAL: NAD, lying in bed  -- NEURO: Awake, alert  -- Abdominal incisions well-appearing, soft and compressible with appropriate incisional TTP. Tissue all healthy and viable appearing. Drains with SS OP      ___________________________________________________  LABS                        11.9   11.11 )-----------( 340      ( 20 Apr 2025 05:15 )             38.0     20 Apr 2025 05:15    135    |  101    |  11     ----------------------------<  88     4.1     |  22     |  0.61     Ca    8.6        20 Apr 2025 05:15        CAPILLARY BLOOD GLUCOSE            Urinalysis Basic - ( 20 Apr 2025 05:15 )    Color: x / Appearance: x / SG: x / pH: x  Gluc: 88 mg/dL / Ketone: x  / Bili: x / Urobili: x   Blood: x / Protein: x / Nitrite: x   Leuk Esterase: x / RBC: x / WBC x   Sq Epi: x / Non Sq Epi: x / Bacteria: x      ___________________________________________________  MICRO  Recent Cultures:    ___________________________________________________  MEDICATIONS  (STANDING):  acetaminophen     Tablet .. 975 milliGRAM(s) Oral every 6 hours  famotidine Injectable 20 milliGRAM(s) IV Push once  heparin   Injectable 5000 Unit(s) SubCutaneous every 8 hours  polyethylene glycol 3350 17 Gram(s) Oral daily  simethicone 80 milliGRAM(s) Chew every 6 hours  sodium chloride 0.9% lock flush 3 milliLiter(s) IV Push every 8 hours    MEDICATIONS  (PRN):  HYDROmorphone  Injectable 0.5 milliGRAM(s) IV Push every 10 minutes PRN Severe Pain (7 - 10)  ketorolac   Injectable 15 milliGRAM(s) IV Push every 6 hours PRN Mild Pain (1 - 3)  magnesium hydroxide Suspension 30 milliLiter(s) Oral daily PRN Constipation  ondansetron Injectable 4 milliGRAM(s) IV Push every 6 hours PRN Nausea and/or Vomiting  oxyCODONE    IR 5 milliGRAM(s) Oral every 4 hours PRN Moderate Pain (4 - 6)  oxyCODONE    IR 10 milliGRAM(s) Oral every 4 hours PRN Severe Pain (7 - 10)  senna 2 Tablet(s) Oral at bedtime PRN Constipation

## 2025-04-20 NOTE — PROGRESS NOTE ADULT - SUBJECTIVE AND OBJECTIVE BOX
Subjective: REGINO. VSS. passed gas today, hasn't had BM yet. took pepcid which helped alot. denies fever/chills, no dysuria, no n/v.     VITALS  Vital Signs Last 24 Hrs  T(C): 36.8 (04-20-25 @ 14:00), Max: 37.3 (04-19-25 @ 17:45)  T(F): 98.3 (04-20-25 @ 14:00), Max: 99.1 (04-19-25 @ 17:45)  HR: 80 (04-20-25 @ 14:00) (79 - 110)  BP: 120/69 (04-20-25 @ 14:00) (114/83 - 151/95)  BP(mean): --  RR: 18 (04-20-25 @ 14:00) (16 - 18)  SpO2: 98% (04-20-25 @ 14:00) (95% - 100%)        PHYSICAL EXAM  Gen:NAD  Respiratory: CTAB; no wheezes/rales/rhonchi  Cardiovascular: Regular rhythm/rate; S1/S2+, no murmurs, rubs gallops   Gastrointestinal: Soft; slight tenderness to palpation  Extremities: WWP; no edema/cyanosis  Neurological: A&Ox3    MEDICATIONS  (STANDING):  acetaminophen     Tablet .. 975 milliGRAM(s) Oral every 6 hours  heparin   Injectable 5000 Unit(s) SubCutaneous every 8 hours  polyethylene glycol 3350 17 Gram(s) Oral daily  simethicone 80 milliGRAM(s) Chew every 6 hours  sodium chloride 0.9% lock flush 3 milliLiter(s) IV Push every 8 hours    MEDICATIONS  (PRN):  HYDROmorphone  Injectable 0.5 milliGRAM(s) IV Push every 10 minutes PRN Severe Pain (7 - 10)  ketorolac   Injectable 15 milliGRAM(s) IV Push every 6 hours PRN Mild Pain (1 - 3)  magnesium hydroxide Suspension 30 milliLiter(s) Oral daily PRN Constipation  ondansetron Injectable 4 milliGRAM(s) IV Push every 6 hours PRN Nausea and/or Vomiting  oxyCODONE    IR 5 milliGRAM(s) Oral every 4 hours PRN Moderate Pain (4 - 6)  oxyCODONE    IR 10 milliGRAM(s) Oral every 4 hours PRN Severe Pain (7 - 10)  senna 2 Tablet(s) Oral at bedtime PRN Constipation      No Known Drug Allergies  shrimp (Hives)      LABS                        11.9   11.11 )-----------( 340      ( 20 Apr 2025 05:15 )             38.0     04-20    135  |  101  |  11  ----------------------------<  88  4.1   |  22  |  0.61    Ca    8.6      20 Apr 2025 05:15        Urinalysis Basic - ( 20 Apr 2025 05:15 )    Color: x / Appearance: x / SG: x / pH: x  Gluc: 88 mg/dL / Ketone: x  / Bili: x / Urobili: x   Blood: x / Protein: x / Nitrite: x   Leuk Esterase: x / RBC: x / WBC x   Sq Epi: x / Non Sq Epi: x / Bacteria: x      CAPILLARY BLOOD GLUCOSE          RADIOLOGY & ADDITIONAL TESTS: Reviewed.          IMAGING/EKG/ETC

## 2025-04-20 NOTE — PROGRESS NOTE ADULT - ASSESSMENT
CHELY ASCENCIO is a 44yFemale s/p Panniculectomy with rectus plication and PRS closure    - Continue to monitor drain OP  - JOSHUA care  - Okay for dispo per primary w/ close follow-up    Qamar Martinez  Plastic Surgery Resident PGY-3  [facility]: [phone]  [facility]: [id number]  Available on [employer] Teams

## 2025-04-20 NOTE — PROGRESS NOTE ADULT - ASSESSMENT
45 y/o F with recent diagnosis of HTN, anxiety, fibroid uterus who is s/p Ex-lap, Total abdominal hysterectomy with bilateral salpingectomy,  left ovarian cystectomy, partial omentectomy and  Lysis of pelvic adhesions for fibroid uterus and panniculectomy with rectus plication by plastic surgery on 4/17    #Fibroid uterus  MR abdomen and pelvis reviewed  -s/p hysterectomy on 4/17  -Pain management per primary  -Recommend stopping senna given pt reported abdominal cramping  -cont Miralax     #Leukocytosis, improving no signs of sepsis.  -Likely iso recent surgery, improving  -No infectious signs or symptoms  -ucx showed <49k strep galllyticus, pt wo symptoms. hold off abx  -Trend CBC daily    #HTN  -Pt attributed recent diagnosis of hypertension to anxiety about surgeries  - would discontinue metoprolol and monitor BP  -Outpatient f/u with PCP for further management    #Belching  #Lack of Flatulence  patient w/ belching since surgery  - recommend Pepcid 20mg bid prn (pt reports marked improved of indigestion and belching with pepcid)  - can consider obtain xray abdomen i/s/o surgery and opioid use to rule of bowel obstructive vs [place]     #Anxiety  -Continue buspar 10mg qd    #NAGMA RESOLVED  -Mild, low c/f bicarb losses or RTA. No recent NS  -Trend BMP daily    #Hypocalcemia RESOLVED  -Normal (8.4) when corrected for albumin level

## 2025-04-20 NOTE — PROGRESS NOTE ADULT - ASSESSMENT
44y female POD#3 s/p Ex-lap, Total abdominal hysterectomy with bilateral salpingectomy,  left ovarian cystectomy, partial omentectomy and  Lysis of pelvic adhesions for fibroid uterus and panniculectomy with rectus plication by plastic surgery. Pt progressing well post-operatively.    Neuro: Tylenol, Dilaudid PRN, Toradol PRN, Oxy PRN  CV: Hemodynamically stable  Hct: 43.1->36.1->40, f/u AM CBC  HTN - home Toprol per OS recs  Pulm: Saturating well on RA. Increase incentive spirometry.  GI: -JOSHUA drain in place- continue with drain care  Reg diet  : Voiding spontaneously   Heme: Continue HSQ/ Venodynes for DVT ppx. Increase OOB.    ID: Afebrile  Endo: No active issues, F/u AM BMP and replete PRN  Dispo: continue inpatient care    *INCOMPLETE NOTE*  Atilio Addison PGY2   44y female POD#3 s/p Ex-lap, Total abdominal hysterectomy with bilateral salpingectomy,  left ovarian cystectomy, partial omentectomy and  Lysis of pelvic adhesions for fibroid uterus and panniculectomy with rectus plication by plastic surgery. Pt progressing well post-operatively.    Neuro: Tylenol, Dilaudid PRN, Toradol PRN, Oxy PRN  CV: Hemodynamically stable  Hct: 43.1->36.1->40, f/u AM CBC  HTN - home Toprol per OS recs  Pulm: Saturating well on RA. Increase incentive spirometry.  GI: -JOSHUA drain in place- continue with drain care  Reg diet - encouraged pt to advance to more solid food (currently only liquids per pt)  Pepcid for burping  : Voiding spontaneously   Heme: Continue HSQ/ Venodynes for DVT ppx. Increase OOB.    ID: Afebrile  Endo: No active issues, F/u AM BMP and replete PRN  Dispo: continue inpatient care    Atilio Addison PGY2   44y female POD#3 s/p Ex-lap, Total abdominal hysterectomy with bilateral salpingectomy,  left ovarian cystectomy, partial omentectomy and  Lysis of pelvic adhesions for fibroid uterus and panniculectomy with rectus plication by plastic surgery. Pt progressing well post-operatively.    Neuro: Tylenol, Dilaudid PRN, Toradol PRN, Oxy PRN  CV: Hemodynamically stable  Hct: 43.1->36.1->40, f/u AM CBC  HTN - home Toprol discontinued per [facility] recs 2/2 pt reports elevated BPs a/w anxiety surrounding surgery. Pt to monitor BPs and f/u outpt with PCP  Pulm: Saturating well on RA. Increase incentive spirometry.  GI: -JOSHUA drain in place- continue with drain care  Reg diet - encouraged pt to advance to more solid food (currently only liquids per pt)  Pepcid for burping  Miralax and Milk of Mag   : Voiding spontaneously   Heme: Continue HSQ/ Venodynes for DVT ppx. Increase OOB.    ID: Afebrile  Endo: No active issues, F/u AM BMP and replete PRN  Dispo: continue inpatient care    Atilio Addison PGY2

## 2025-04-21 ENCOUNTER — TRANSCRIPTION ENCOUNTER (OUTPATIENT)
Age: 45
End: 2025-04-21

## 2025-04-21 VITALS
TEMPERATURE: 99 F | HEART RATE: 73 BPM | DIASTOLIC BLOOD PRESSURE: 77 MMHG | RESPIRATION RATE: 18 BRPM | SYSTOLIC BLOOD PRESSURE: 131 MMHG | OXYGEN SATURATION: 99 %

## 2025-04-21 LAB
ANION GAP SERPL CALC-SCNC: 11 MMOL/L — SIGNIFICANT CHANGE UP (ref 7–14)
BUN SERPL-MCNC: 8 MG/DL — SIGNIFICANT CHANGE UP (ref 7–23)
CALCIUM SERPL-MCNC: 8.4 MG/DL — SIGNIFICANT CHANGE UP (ref 8.4–10.5)
CHLORIDE SERPL-SCNC: 102 MMOL/L — SIGNIFICANT CHANGE UP (ref 98–107)
CO2 SERPL-SCNC: 24 MMOL/L — SIGNIFICANT CHANGE UP (ref 22–31)
CREAT SERPL-MCNC: 0.62 MG/DL — SIGNIFICANT CHANGE UP (ref 0.5–1.3)
EGFR: 113 ML/MIN/1.73M2 — SIGNIFICANT CHANGE UP
EGFR: 113 ML/MIN/1.73M2 — SIGNIFICANT CHANGE UP
GLUCOSE SERPL-MCNC: 89 MG/DL — SIGNIFICANT CHANGE UP (ref 70–99)
HCT VFR BLD CALC: 34.3 % — LOW (ref 34.5–45)
HGB BLD-MCNC: 10.9 G/DL — LOW (ref 11.5–15.5)
MCHC RBC-ENTMCNC: 25 PG — LOW (ref 27–34)
MCHC RBC-ENTMCNC: 31.8 G/DL — LOW (ref 32–36)
MCV RBC AUTO: 78.7 FL — LOW (ref 80–100)
NRBC # BLD AUTO: 0 K/UL — SIGNIFICANT CHANGE UP (ref 0–0)
NRBC # FLD: 0 K/UL — SIGNIFICANT CHANGE UP (ref 0–0)
NRBC BLD AUTO-RTO: 0 /100 WBCS — SIGNIFICANT CHANGE UP (ref 0–0)
PLATELET # BLD AUTO: 320 K/UL — SIGNIFICANT CHANGE UP (ref 150–400)
POTASSIUM SERPL-MCNC: 4 MMOL/L — SIGNIFICANT CHANGE UP (ref 3.5–5.3)
POTASSIUM SERPL-SCNC: 4 MMOL/L — SIGNIFICANT CHANGE UP (ref 3.5–5.3)
RBC # BLD: 4.36 M/UL — SIGNIFICANT CHANGE UP (ref 3.8–5.2)
RBC # FLD: 15.9 % — HIGH (ref 10.3–14.5)
SODIUM SERPL-SCNC: 137 MMOL/L — SIGNIFICANT CHANGE UP (ref 135–145)
WBC # BLD: 8.57 K/UL — SIGNIFICANT CHANGE UP (ref 3.8–10.5)
WBC # FLD AUTO: 8.57 K/UL — SIGNIFICANT CHANGE UP (ref 3.8–10.5)

## 2025-04-21 PROCEDURE — 99232 SBSQ HOSP IP/OBS MODERATE 35: CPT

## 2025-04-21 RX ORDER — ONDANSETRON HCL/PF 4 MG/2 ML
4 VIAL (ML) INJECTION ONCE
Refills: 0 | Status: COMPLETED | OUTPATIENT
Start: 2025-04-21 | End: 2025-04-21

## 2025-04-21 RX ORDER — ONDANSETRON HCL/PF 4 MG/2 ML
1 VIAL (ML) INJECTION
Qty: 10 | Refills: 0
Start: 2025-04-21

## 2025-04-21 RX ORDER — ONDANSETRON HCL/PF 4 MG/2 ML
4 VIAL (ML) INJECTION EVERY 6 HOURS
Refills: 0 | Status: DISCONTINUED | OUTPATIENT
Start: 2025-04-21 | End: 2025-04-21

## 2025-04-21 RX ORDER — ACETAMINOPHEN 500 MG/5ML
3 LIQUID (ML) ORAL
Refills: 0 | DISCHARGE

## 2025-04-21 RX ORDER — IBUPROFEN 200 MG
1 TABLET ORAL
Refills: 0 | DISCHARGE

## 2025-04-21 RX ORDER — ONDANSETRON HCL/PF 4 MG/2 ML
1 VIAL (ML) INJECTION
Qty: 2 | Refills: 1
Start: 2025-04-21

## 2025-04-21 RX ORDER — OXYCODONE HYDROCHLORIDE 30 MG/1
1 TABLET ORAL
Qty: 10 | Refills: 0
Start: 2025-04-21

## 2025-04-21 RX ADMIN — OXYCODONE HYDROCHLORIDE 5 MILLIGRAM(S): 30 TABLET ORAL at 05:31

## 2025-04-21 RX ADMIN — Medication 3 MILLILITER(S): at 14:25

## 2025-04-21 RX ADMIN — Medication 975 MILLIGRAM(S): at 01:45

## 2025-04-21 RX ADMIN — POLYETHYLENE GLYCOL 3350 17 GRAM(S): 17 POWDER, FOR SOLUTION ORAL at 12:46

## 2025-04-21 RX ADMIN — Medication 3 MILLILITER(S): at 06:25

## 2025-04-21 RX ADMIN — OXYCODONE HYDROCHLORIDE 10 MILLIGRAM(S): 30 TABLET ORAL at 16:08

## 2025-04-21 RX ADMIN — Medication 975 MILLIGRAM(S): at 07:30

## 2025-04-21 RX ADMIN — Medication 20 MILLIGRAM(S): at 09:59

## 2025-04-21 RX ADMIN — OXYCODONE HYDROCHLORIDE 5 MILLIGRAM(S): 30 TABLET ORAL at 10:45

## 2025-04-21 RX ADMIN — HEPARIN SODIUM 5000 UNIT(S): 1000 INJECTION INTRAVENOUS; SUBCUTANEOUS at 01:04

## 2025-04-21 RX ADMIN — Medication 4 MILLIGRAM(S): at 05:31

## 2025-04-21 RX ADMIN — OXYCODONE HYDROCHLORIDE 5 MILLIGRAM(S): 30 TABLET ORAL at 09:59

## 2025-04-21 RX ADMIN — OXYCODONE HYDROCHLORIDE 5 MILLIGRAM(S): 30 TABLET ORAL at 06:30

## 2025-04-21 RX ADMIN — Medication 80 MILLIGRAM(S): at 05:14

## 2025-04-21 RX ADMIN — Medication 975 MILLIGRAM(S): at 01:03

## 2025-04-21 RX ADMIN — Medication 975 MILLIGRAM(S): at 13:45

## 2025-04-21 RX ADMIN — OXYCODONE HYDROCHLORIDE 10 MILLIGRAM(S): 30 TABLET ORAL at 15:08

## 2025-04-21 RX ADMIN — Medication 975 MILLIGRAM(S): at 06:50

## 2025-04-21 RX ADMIN — Medication 975 MILLIGRAM(S): at 12:46

## 2025-04-21 NOTE — PROGRESS NOTE ADULT - NSPROGADDITIONALINFOA_GEN_ALL_CORE
Gyn Onc Fellow Addendum:  Pt seen and examined at bedside on AM rounds. Agree with above.  Hemodynamically stable   Continue current pain regimen  Tolerating reg diet without further emesis   Voiding spontaneously  Appreciate Plastics recs   Appreciate [facility] recs  Encourage ambulation and IS use  Replete lytes prn  DVT ppx: HSQ, SCDs  Dispo: anticipate discharge home today   Kimberly Neri MD

## 2025-04-21 NOTE — DISCHARGE NOTE NURSING/CASE MANAGEMENT/SOCIAL WORK - FINANCIAL ASSISTANCE
[facility] provides services at a reduced cost to those who are determined to be eligible through [facility]’s financial assistance program. Information regarding [facility]’s financial assistance program can be found by going to [email or web address] or by calling [phone].

## 2025-04-21 NOTE — PROGRESS NOTE ADULT - ASSESSMENT
43 y/o F with recent diagnosis of HTN, anxiety, fibroid uterus who is s/p Ex-lap, Total abdominal hysterectomy with bilateral salpingectomy,  left ovarian cystectomy, partial omentectomy and  Lysis of pelvic adhesions for fibroid uterus and panniculectomy with rectus plication by plastic surgery on 4/17    #Fibroid uterus  MR abdomen and pelvis reviewed  -s/p hysterectomy on 4/17  -Pain management per primary  -Recommend stopping senna given pt reported abdominal cramping  -cont Miralax     #Leukocytosis, improving no signs of sepsis.  -Likely iso recent surgery, improving  -No infectious signs or symptoms  -ucx showed <49k strep galllyticus, pt wo symptoms. hold off abx  -Trend CBC daily    #HTN  -Pt attributed recent diagnosis of hypertension to anxiety about surgeries  - would discontinue metoprolol and monitor BP  -Outpatient f/u with PCP for further management    #Belching  #Lack of Flatulence  patient w/ belching since surgery  - recommend Pepcid 20mg bid prn (pt reports marked improved of indigestion and belching with pepcid)      #Anxiety  -Continue buspar 10mg qd    #NAGMA RESOLVED  -Mild, low c/f bicarb losses or RTA. No recent NS  -Trend BMP daily    #Hypocalcemia RESOLVED  -Normal (8.4) when corrected for albumin level     45 y/o F with recent diagnosis of HTN, anxiety, fibroid uterus who is s/p Ex-lap, Total abdominal hysterectomy with bilateral salpingectomy,  left ovarian cystectomy, partial omentectomy and  Lysis of pelvic adhesions for fibroid uterus and panniculectomy with rectus plication by plastic surgery on 4/17    #Fibroid uterus  MR abdomen and pelvis reviewed  -s/p hysterectomy on 4/17  -Pain management per primary  -Recommend stopping senna given pt reported abdominal cramping  -cont Miralax     #Leukocytosis, no signs of sepsis. Resolved.  -Likely iso recent surgery, improving  -No infectious signs or symptoms  -ucx showed <49k strep galllyticus, pt wo symptoms. hold off abx  -Trend CBC daily    #HTN  -Pt attributed recent diagnosis of hypertension to anxiety about surgeries  - would discontinue metoprolol and monitor BP  -Outpatient f/u with PCP for further management    #Belching  #Lack of Flatulence  patient w/ belching since surgery  - continue Pepcid 20mg bid prn (pt reports marked improved of indigestion and belching with pepcid)      #Anxiety  -Continue buspar 10mg qd    #NAGMA RESOLVED  -Mild, low c/f bicarb losses or RTA. No recent NS  -Trend BMP daily    #Hypocalcemia RESOLVED  -Normal (8.4) when corrected for albumin level    dc planning per Primary team

## 2025-04-21 NOTE — PROGRESS NOTE ADULT - PROVIDER SPECIALTY LIST ADULT
Gyn Onc
Gyn Onc
Plastic Surgery
Gyn Onc
Gyn Onc
Hospitalist
Hospitalist
Plastic Surgery
Hospitalist

## 2025-04-21 NOTE — PROGRESS NOTE ADULT - ASSESSMENT
44y female POD#4 s/p Ex-lap, Total abdominal hysterectomy with bilateral salpingectomy,  left ovarian cystectomy, partial omentectomy and  Lysis of pelvic adhesions for fibroid uterus and panniculectomy with rectus plication by plastic surgery. Pt progressing well post-operatively.    Neuro: Tylenol, Dilaudid PRN, Toradol PRN, Oxy PRN  CV: Hemodynamically stable  Hct: 43.1->36.1->40>>34.3 this AM  HTN - home Toprol discontinued per [facility] recs 2/2 pt reports elevated BPs a/w anxiety surrounding surgery. Pt to monitor BPs and f/u outpt with PCP  Pulm: Saturating well on RA. Increase incentive spirometry.  GI: -JOSHUA drain in place- continue with drain care  Reg diet - encouraged pt to advance to more solid food (currently only liquids per pt)  Pepcid for burping  Miralax and Milk of Mag   : Voiding spontaneously   Heme: Continue HSQ/ Venodynes for DVT ppx. Increase OOB.    ID: Afebrile, no leukocytosis  Endo: No active issues, electrolytes wnl  Dispo: continue inpatient care    Raiza Lan, PGY-1   44y female POD#4 s/p Ex-lap, Total abdominal hysterectomy with bilateral salpingectomy,  left ovarian cystectomy, partial omentectomy and lysis of pelvic adhesions for fibroid uterus and panniculectomy with rectus plication by plastic surgery. Pt progressing well post-operatively.    Neuro: Tylenol, Dilaudid PRN, Toradol PRN, Oxy PRN  CV: Hemodynamically stable  Hct: 43.1->36.1->40>>34.3 this AM  HTN: home Toprol discontinued per [facility] recs 2/2 pt reports elevated BPs a/w anxiety surrounding surgery. Pt to monitor BPs and f/u outpt with PCP  Pulm: Saturating well on RA. Increase incentive spirometry.  GI: Reg diet. C/w Zofran, Pepcid, bowel regimen  : Voiding spontaneously.  Heme: Continue HSQ/Venodynes for DVT ppx. Increase OOB.    ID: Afebrile, no leukocytosis  Endo: No active issues, electrolytes wnl  MSK: C/w JOSHUA drain care, f/u outpt with plastic surgery upon discharge  Dispo: continue inpatient care    Raiza Lan, PGY-1

## 2025-04-21 NOTE — PROGRESS NOTE ADULT - SUBJECTIVE AND OBJECTIVE BOX
Patient seen and examined while she was sitting up in chair.  No acute events overnight. She reports having nausea yesterday and this AM, resolved with Zofran. Her pain is well controlled. She is OOB, ambulating and voiding. She has been passing gas. Denies CP, SOB, vomiting, fevers, and chills.    Vital Signs Last 24 Hours  T(C): 36.5 (04-21-25 @ 05:17), Max: 36.8 (04-20-25 @ 14:00)  HR: 74 (04-21-25 @ 05:17) (74 - 92)  BP: 117/86 (04-21-25 @ 05:17) (117/86 - 135/93)  RR: 18 (04-21-25 @ 05:17) (18 - 18)  SpO2: 97% (04-21-25 @ 05:17) (97% - 100%)    I&O's Summary    19 Apr 2025 07:01  -  20 Apr 2025 07:00  --------------------------------------------------------  IN: 960 mL / OUT: 1427 mL / NET: -467 mL    20 Apr 2025 07:01  -  21 Apr 2025 06:53  --------------------------------------------------------  IN: 1080 mL / OUT: 1097.5 mL / NET: -17.5 mL        Physical Exam:  General: NAD, sitting up in chair  CV: RRR  Lungs: CTAB  Abdomen: Soft, mildly tender, tympanic, normoactive bowel sounds  Incision: clean, dry, intact  Ext: No pain or swelling in lower extremities bilaterally     Labs:                        10.9   8.57  )-----------( 320      ( 21 Apr 2025 04:32 )             34.3   baso x      eos x      imm gran x      lymph x      mono x      poly x                            11.9   11.11 )-----------( 340      ( 20 Apr 2025 05:15 )             38.0   baso x      eos x      imm gran x      lymph x      mono x      poly x                            12.5   11.39 )-----------( 358      ( 19 Apr 2025 03:58 )             40.0   baso x      eos x      imm gran 0.4    lymph x      mono x      poly x          MEDICATIONS  (STANDING):  acetaminophen     Tablet .. 975 milliGRAM(s) Oral every 6 hours  famotidine    Tablet 20 milliGRAM(s) Oral two times a day  famotidine Injectable 20 milliGRAM(s) IV Push once  heparin   Injectable 5000 Unit(s) SubCutaneous every 8 hours  polyethylene glycol 3350 17 Gram(s) Oral daily  simethicone 80 milliGRAM(s) Chew every 6 hours  sodium chloride 0.9% lock flush 3 milliLiter(s) IV Push every 8 hours    MEDICATIONS  (PRN):  HYDROmorphone  Injectable 0.5 milliGRAM(s) IV Push every 10 minutes PRN Severe Pain (7 - 10)  ketorolac   Injectable 15 milliGRAM(s) IV Push every 6 hours PRN Mild Pain (1 - 3)  magnesium hydroxide Suspension 30 milliLiter(s) Oral daily PRN Constipation  ondansetron Injectable 4 milliGRAM(s) IV Push every 6 hours PRN Nausea and/or Vomiting  oxyCODONE    IR 5 milliGRAM(s) Oral every 4 hours PRN Moderate Pain (4 - 6)  oxyCODONE    IR 10 milliGRAM(s) Oral every 4 hours PRN Severe Pain (7 - 10)  senna 2 Tablet(s) Oral at bedtime PRN Constipation       Patient seen and examined while she was sitting up in chair.  No acute events overnight. She reports having nausea yesterday and this AM, resolved with Zofran. Her pain is well controlled. She is OOB, ambulating and voiding. She has been passing gas. Denies CP, SOB, vomiting, fevers, and chills.    Vital Signs Last 24 Hours  T(C): 36.5 (04-21-25 @ 05:17), Max: 36.8 (04-20-25 @ 14:00)  HR: 74 (04-21-25 @ 05:17) (74 - 92)  BP: 117/86 (04-21-25 @ 05:17) (117/86 - 135/93)  RR: 18 (04-21-25 @ 05:17) (18 - 18)  SpO2: 97% (04-21-25 @ 05:17) (97% - 100%)    I&O's Summary    19 Apr 2025 07:01  -  20 Apr 2025 07:00  --------------------------------------------------------  IN: 960 mL / OUT: 1427 mL / NET: -467 mL    20 Apr 2025 07:01  -  21 Apr 2025 06:53  --------------------------------------------------------  IN: 1080 mL / OUT: 1097.5 mL / NET: -17.5 mL    Physical Exam:  General: NAD, sitting up in chair  CV: RRR  Lungs: CTAB  Abdomen: Soft, mildly tender, tympanic, normoactive bowel sounds  Incision: clean, dry, intact  Ext: No pain or swelling in lower extremities bilaterally  Drains: JPx2 with minimal serosanguineous output    Labs:                        10.9   8.57  )-----------( 320      ( 21 Apr 2025 04:32 )             34.3   baso x      eos x      imm gran x      lymph x      mono x      poly x                            11.9   11.11 )-----------( 340      ( 20 Apr 2025 05:15 )             38.0   baso x      eos x      imm gran x      lymph x      mono x      poly x                            12.5   11.39 )-----------( 358      ( 19 Apr 2025 03:58 )             40.0   baso x      eos x      imm gran 0.4    lymph x      mono x      poly x          MEDICATIONS  (STANDING):  acetaminophen     Tablet .. 975 milliGRAM(s) Oral every 6 hours  famotidine    Tablet 20 milliGRAM(s) Oral two times a day  famotidine Injectable 20 milliGRAM(s) IV Push once  heparin   Injectable 5000 Unit(s) SubCutaneous every 8 hours  polyethylene glycol 3350 17 Gram(s) Oral daily  simethicone 80 milliGRAM(s) Chew every 6 hours  sodium chloride 0.9% lock flush 3 milliLiter(s) IV Push every 8 hours    MEDICATIONS  (PRN):  HYDROmorphone  Injectable 0.5 milliGRAM(s) IV Push every 10 minutes PRN Severe Pain (7 - 10)  ketorolac   Injectable 15 milliGRAM(s) IV Push every 6 hours PRN Mild Pain (1 - 3)  magnesium hydroxide Suspension 30 milliLiter(s) Oral daily PRN Constipation  ondansetron Injectable 4 milliGRAM(s) IV Push every 6 hours PRN Nausea and/or Vomiting  oxyCODONE    IR 5 milliGRAM(s) Oral every 4 hours PRN Moderate Pain (4 - 6)  oxyCODONE    IR 10 milliGRAM(s) Oral every 4 hours PRN Severe Pain (7 - 10)  senna 2 Tablet(s) Oral at bedtime PRN Constipation

## 2025-04-21 NOTE — DISCHARGE NOTE NURSING/CASE MANAGEMENT/SOCIAL WORK - PATIENT PORTAL LINK FT
You can access the FollowMyHealth Patient Portal offered by [facility] by registering at the following website: [email or web address]. By joining [facility]’s FollowMyHealth portal, you will also be able to view your health information using other applications (apps) compatible with our system.

## 2025-04-21 NOTE — PROGRESS NOTE ADULT - SUBJECTIVE AND OBJECTIVE BOX
Plastic Surgery Progress Note (pg [facility]: [id number], [facility]: [phone])    SUBJECTIVE  The patient was seen and examined. No acute events overnight. Pain controlled, afebrile w/ stable vitals.     OBJECTIVE  ___________________________________________________  VITAL SIGNS / I&O's   Vital Signs Last 24 Hrs  T(C): 36.5 (21 Apr 2025 05:17), Max: 36.8 (20 Apr 2025 14:00)  T(F): 97.7 (21 Apr 2025 05:17), Max: 98.3 (20 Apr 2025 14:00)  HR: 74 (21 Apr 2025 05:17) (74 - 92)  BP: 117/86 (21 Apr 2025 05:17) (117/86 - 135/93)  BP(mean): --  RR: 18 (21 Apr 2025 05:17) (18 - 18)  SpO2: 97% (21 Apr 2025 05:17) (97% - 100%)    Parameters below as of 21 Apr 2025 05:17  Patient On (Oxygen Delivery Method): room air          20 Apr 2025 07:01  -  21 Apr 2025 07:00  --------------------------------------------------------  IN:    Oral Fluid: 1080 mL  Total IN: 1080 mL    OUT:    Bulb (mL): 30 mL    Bulb (mL): 17.5 mL    Voided (mL): 1050 mL  Total OUT: 1097.5 mL    Total NET: -17.5 mL        ___________________________________________________  PHYSICAL EXAM  -- CONSTITUTIONAL: NAD, lying in bed  -- NEURO: Awake, alert  -- Abdominal incisions well-appearing, soft and compressible with appropriate incisional TTP. Tissue all healthy and viable appearing. Drains with SS OP    ___________________________________________________  LABS                        10.9   8.57  )-----------( 320      ( 21 Apr 2025 04:32 )             34.3     21 Apr 2025 04:32    137    |  102    |  8      ----------------------------<  89     4.0     |  24     |  0.62     Ca    8.4        21 Apr 2025 04:32        CAPILLARY BLOOD GLUCOSE            Urinalysis Basic - ( 21 Apr 2025 04:32 )    Color: x / Appearance: x / SG: x / pH: x  Gluc: 89 mg/dL / Ketone: x  / Bili: x / Urobili: x   Blood: x / Protein: x / Nitrite: x   Leuk Esterase: x / RBC: x / WBC x   Sq Epi: x / Non Sq Epi: x / Bacteria: x      ___________________________________________________  MICRO  Recent Cultures:    ___________________________________________________  MEDICATIONS  (STANDING):  acetaminophen     Tablet .. 975 milliGRAM(s) Oral every 6 hours  famotidine    Tablet 20 milliGRAM(s) Oral two times a day  famotidine Injectable 20 milliGRAM(s) IV Push once  heparin   Injectable 5000 Unit(s) SubCutaneous every 8 hours  polyethylene glycol 3350 17 Gram(s) Oral daily  simethicone 80 milliGRAM(s) Chew every 6 hours  sodium chloride 0.9% lock flush 3 milliLiter(s) IV Push every 8 hours    MEDICATIONS  (PRN):  HYDROmorphone  Injectable 0.5 milliGRAM(s) IV Push every 10 minutes PRN Severe Pain (7 - 10)  ketorolac   Injectable 15 milliGRAM(s) IV Push every 6 hours PRN Mild Pain (1 - 3)  magnesium hydroxide Suspension 30 milliLiter(s) Oral daily PRN Constipation  ondansetron    Tablet 4 milliGRAM(s) Oral every 6 hours PRN Nausea and/or Vomiting  oxyCODONE    IR 5 milliGRAM(s) Oral every 4 hours PRN Moderate Pain (4 - 6)  oxyCODONE    IR 10 milliGRAM(s) Oral every 4 hours PRN Severe Pain (7 - 10)  senna 2 Tablet(s) Oral at bedtime PRN Constipation

## 2025-04-21 NOTE — PROGRESS NOTE ADULT - ASSESSMENT
CHELY ASCENCIO is a 44yFemale s/p Panniculectomy with rectus plication and PRS closure    - Continue to monitor drain OP  - JOSHUA care  - Okay for dispo per primary w/ close follow-up    Valentino Arriola MD   [facility] Surgery Resident PGY1    For any questions, please DO NOT reach out via Teams.    Plastic Surgery   [facility]: [id number]  [facility]: [phone]

## 2025-04-21 NOTE — PROGRESS NOTE ADULT - SUBJECTIVE AND OBJECTIVE BOX
Subjective:     VITALS  Vital Signs Last 24 Hrs  T(C): 36.7 (21 Apr 2025 09:35), Max: 36.8 (20 Apr 2025 14:00)  T(F): 98.1 (21 Apr 2025 09:35), Max: 98.3 (20 Apr 2025 14:00)  HR: 81 (21 Apr 2025 09:35) (74 - 92)  BP: 119/76 (21 Apr 2025 09:35) (117/86 - 135/93)  BP(mean): --  RR: 17 (21 Apr 2025 09:35) (17 - 18)  SpO2: 99% (21 Apr 2025 09:35) (97% - 100%)    Parameters below as of 21 Apr 2025 09:35  Patient On (Oxygen Delivery Method): room air        PHYSICAL EXAM  Gen:NAD  Respiratory: CTAB; no wheezes/rales/rhonchi  Cardiovascular: Regular rhythm/rate; S1/S2+, no murmurs, rubs gallops   Gastrointestinal: Soft; slight tenderness to palpation  Extremities: WWP; no edema/cyanosis  Neurological: A&Ox3    MEDICATIONS  (STANDING):  acetaminophen     Tablet .. 975 milliGRAM(s) Oral every 6 hours  heparin   Injectable 5000 Unit(s) SubCutaneous every 8 hours  polyethylene glycol 3350 17 Gram(s) Oral daily  simethicone 80 milliGRAM(s) Chew every 6 hours  sodium chloride 0.9% lock flush 3 milliLiter(s) IV Push every 8 hours    MEDICATIONS  (PRN):  HYDROmorphone  Injectable 0.5 milliGRAM(s) IV Push every 10 minutes PRN Severe Pain (7 - 10)  ketorolac   Injectable 15 milliGRAM(s) IV Push every 6 hours PRN Mild Pain (1 - 3)  magnesium hydroxide Suspension 30 milliLiter(s) Oral daily PRN Constipation  ondansetron Injectable 4 milliGRAM(s) IV Push every 6 hours PRN Nausea and/or Vomiting  oxyCODONE    IR 5 milliGRAM(s) Oral every 4 hours PRN Moderate Pain (4 - 6)  oxyCODONE    IR 10 milliGRAM(s) Oral every 4 hours PRN Severe Pain (7 - 10)  senna 2 Tablet(s) Oral at bedtime PRN Constipation      No Known Drug Allergies  shrimp (Hives)      LABS                          10.9   8.57  )-----------( 320      ( 21 Apr 2025 04:32 )             34.3     04-21    137  |  102  |  8   ----------------------------<  89  4.0   |  24  |  0.62    Ca    8.4      21 Apr 2025 04:32        Urinalysis Basic - ( 21 Apr 2025 04:32 )    Color: x / Appearance: x / SG: x / pH: x  Gluc: 89 mg/dL / Ketone: x  / Bili: x / Urobili: x   Blood: x / Protein: x / Nitrite: x   Leuk Esterase: x / RBC: x / WBC x   Sq Epi: x / Non Sq Epi: x / Bacteria: x      CAPILLARY BLOOD GLUCOSE          RADIOLOGY & ADDITIONAL TESTS: Reviewed.          IMAGING/EKG/ETC   Subjective: NAEO. VSS. afebrile. reports doing better. able to pass flatus. hasn't had BM. continues to burp. felt better after miralax and pepcid last night. Denies fever/chills/chest pain. surgical site with slight pain, denies drainage.     VITALS  Vital Signs Last 24 Hrs  T(C): 36.7 (21 Apr 2025 09:35), Max: 36.8 (20 Apr 2025 14:00)  T(F): 98.1 (21 Apr 2025 09:35), Max: 98.3 (20 Apr 2025 14:00)  HR: 81 (21 Apr 2025 09:35) (74 - 92)  BP: 119/76 (21 Apr 2025 09:35) (117/86 - 135/93)  BP(mean): --  RR: 17 (21 Apr 2025 09:35) (17 - 18)  SpO2: 99% (21 Apr 2025 09:35) (97% - 100%)    Parameters below as of 21 Apr 2025 09:35  Patient On (Oxygen Delivery Method): room air        PHYSICAL EXAM  Gen:NAD  Respiratory: CTAB; no wheezes/rales/rhonchi  Cardiovascular: Regular rhythm/rate; S1/S2+, no murmurs, rubs gallops   Gastrointestinal: Soft; slight tenderness to palpation, Surgical site C/D/I  Extremities: WWP; trace bilateral edema  Neurological: A&Ox3    MEDICATIONS  (STANDING):  acetaminophen     Tablet .. 975 milliGRAM(s) Oral every 6 hours  heparin   Injectable 5000 Unit(s) SubCutaneous every 8 hours  polyethylene glycol 3350 17 Gram(s) Oral daily  simethicone 80 milliGRAM(s) Chew every 6 hours  sodium chloride 0.9% lock flush 3 milliLiter(s) IV Push every 8 hours    MEDICATIONS  (PRN):  HYDROmorphone  Injectable 0.5 milliGRAM(s) IV Push every 10 minutes PRN Severe Pain (7 - 10)  ketorolac   Injectable 15 milliGRAM(s) IV Push every 6 hours PRN Mild Pain (1 - 3)  magnesium hydroxide Suspension 30 milliLiter(s) Oral daily PRN Constipation  ondansetron Injectable 4 milliGRAM(s) IV Push every 6 hours PRN Nausea and/or Vomiting  oxyCODONE    IR 5 milliGRAM(s) Oral every 4 hours PRN Moderate Pain (4 - 6)  oxyCODONE    IR 10 milliGRAM(s) Oral every 4 hours PRN Severe Pain (7 - 10)  senna 2 Tablet(s) Oral at bedtime PRN Constipation      No Known Drug Allergies  shrimp (Hives)      LABS                          10.9   8.57  )-----------( 320      ( 21 Apr 2025 04:32 )             34.3     04-21    137  |  102  |  8   ----------------------------<  89  4.0   |  24  |  0.62    Ca    8.4      21 Apr 2025 04:32        Urinalysis Basic - ( 21 Apr 2025 04:32 )    Color: x / Appearance: x / SG: x / pH: x  Gluc: 89 mg/dL / Ketone: x  / Bili: x / Urobili: x   Blood: x / Protein: x / Nitrite: x   Leuk Esterase: x / RBC: x / WBC x   Sq Epi: x / Non Sq Epi: x / Bacteria: x      CAPILLARY BLOOD GLUCOSE          RADIOLOGY & ADDITIONAL TESTS: Reviewed.          IMAGING/EKG/ETC

## 2025-04-21 NOTE — PROGRESS NOTE ADULT - ATTENDING COMMENTS
I have examined the patient and discussed the treatment plan with the patient as well as fellows and residents involved in the ongoing care.  We have reviewed pertinent clinical findings and I agree with the findings and plan detailed above with the following addendum.    Continued nausea, 1 ep of emesis  Has passed gas starting this AM   Abd distended, tympanitic     Continue to monitor for N/V and bowel fxn    Tonya Pearson MD, FACOG  Gynecologic Oncology
Patient seen and evaluated on rounds.   Wound healing well.   Feels better. Tolerating regular diet. Nausea resolved.   Stable to DC home today.
I have examined the patient and discussed the treatment plan with the patient as well as fellows and residents involved in the ongoing care.  We have reviewed pertinent clinical findings and I agree with the findings and plan detailed above with the following addendum.    POD#2  OOB  No flatus  Advance to reg diet  Abdomen distended with gas    Tonya Pearson MD, FACOG  Gynecologic Oncology

## 2025-04-23 ENCOUNTER — APPOINTMENT (OUTPATIENT)
Dept: PLASTIC SURGERY | Facility: CLINIC | Age: 45
End: 2025-04-23

## 2025-04-23 ENCOUNTER — NON-APPOINTMENT (OUTPATIENT)
Age: 45
End: 2025-04-23

## 2025-04-23 VITALS
WEIGHT: 220 LBS | HEIGHT: 70 IN | BODY MASS INDEX: 31.5 KG/M2 | HEART RATE: 81 BPM | DIASTOLIC BLOOD PRESSURE: 75 MMHG | OXYGEN SATURATION: 99 % | SYSTOLIC BLOOD PRESSURE: 129 MMHG | TEMPERATURE: 98 F

## 2025-04-23 DIAGNOSIS — Z98.890 OTHER SPECIFIED POSTPROCEDURAL STATES: ICD-10-CM

## 2025-04-23 PROCEDURE — 99024 POSTOP FOLLOW-UP VISIT: CPT

## 2025-04-23 RX ORDER — IBUPROFEN 800 MG/1
800 TABLET, FILM COATED ORAL 3 TIMES DAILY
Qty: 30 | Refills: 0 | Status: ACTIVE | COMMUNITY
Start: 2025-04-23 | End: 1900-01-01

## 2025-04-24 LAB — SURGICAL PATHOLOGY STUDY: SIGNIFICANT CHANGE UP

## 2025-04-30 ENCOUNTER — APPOINTMENT (OUTPATIENT)
Dept: PLASTIC SURGERY | Facility: CLINIC | Age: 45
End: 2025-04-30

## 2025-04-30 VITALS
TEMPERATURE: 98.1 F | HEART RATE: 79 BPM | SYSTOLIC BLOOD PRESSURE: 146 MMHG | OXYGEN SATURATION: 99 % | HEIGHT: 70 IN | BODY MASS INDEX: 31.5 KG/M2 | DIASTOLIC BLOOD PRESSURE: 89 MMHG | WEIGHT: 220 LBS

## 2025-04-30 PROCEDURE — 99024 POSTOP FOLLOW-UP VISIT: CPT

## 2025-05-05 ENCOUNTER — APPOINTMENT (OUTPATIENT)
Dept: GYNECOLOGIC ONCOLOGY | Facility: CLINIC | Age: 45
End: 2025-05-05
Payer: COMMERCIAL

## 2025-05-05 VITALS
TEMPERATURE: 98.1 F | SYSTOLIC BLOOD PRESSURE: 136 MMHG | HEART RATE: 79 BPM | BODY MASS INDEX: 30.35 KG/M2 | HEIGHT: 70 IN | WEIGHT: 212 LBS | OXYGEN SATURATION: 97 % | DIASTOLIC BLOOD PRESSURE: 92 MMHG

## 2025-05-05 DIAGNOSIS — N85.2 HYPERTROPHY OF UTERUS: ICD-10-CM

## 2025-05-05 DIAGNOSIS — D25.9 LEIOMYOMA OF UTERUS, UNSPECIFIED: ICD-10-CM

## 2025-05-05 PROCEDURE — 99024 POSTOP FOLLOW-UP VISIT: CPT

## 2025-05-16 ENCOUNTER — APPOINTMENT (OUTPATIENT)
Dept: PLASTIC SURGERY | Facility: CLINIC | Age: 45
End: 2025-05-16

## 2025-05-16 VITALS
OXYGEN SATURATION: 99 % | TEMPERATURE: 97.9 F | BODY MASS INDEX: 30.35 KG/M2 | DIASTOLIC BLOOD PRESSURE: 65 MMHG | WEIGHT: 212 LBS | HEART RATE: 78 BPM | SYSTOLIC BLOOD PRESSURE: 110 MMHG | HEIGHT: 70 IN

## 2025-05-16 DIAGNOSIS — T81.30XA DISRUPTION OF WOUND, UNSPECIFIED, INITIAL ENCOUNTER: ICD-10-CM

## 2025-05-16 DIAGNOSIS — L50.8 OTHER URTICARIA: ICD-10-CM

## 2025-05-16 PROCEDURE — 99024 POSTOP FOLLOW-UP VISIT: CPT

## 2025-05-16 RX ORDER — CEFADROXIL 500 MG/1
500 CAPSULE ORAL TWICE DAILY
Qty: 14 | Refills: 0 | Status: ACTIVE | COMMUNITY
Start: 2025-05-16 | End: 1900-01-01

## 2025-05-16 RX ORDER — SODIUM HYPOCHLORITE 1.25 MG/ML
0.12 SOLUTION TOPICAL
Qty: 1 | Refills: 0 | Status: ACTIVE | COMMUNITY
Start: 2025-05-16 | End: 1900-01-01

## 2025-05-16 RX ORDER — METHYLPREDNISOLONE 4 MG/1
4 TABLET ORAL
Qty: 1 | Refills: 0 | Status: ACTIVE | COMMUNITY
Start: 2025-05-16 | End: 1900-01-01

## 2025-05-23 ENCOUNTER — APPOINTMENT (OUTPATIENT)
Dept: PLASTIC SURGERY | Facility: CLINIC | Age: 45
End: 2025-05-23

## 2025-05-23 VITALS
BODY MASS INDEX: 30.35 KG/M2 | HEART RATE: 68 BPM | WEIGHT: 212 LBS | SYSTOLIC BLOOD PRESSURE: 134 MMHG | HEIGHT: 70 IN | TEMPERATURE: 98 F | OXYGEN SATURATION: 100 % | DIASTOLIC BLOOD PRESSURE: 90 MMHG

## 2025-05-23 PROCEDURE — 99024 POSTOP FOLLOW-UP VISIT: CPT

## 2025-05-23 PROCEDURE — 17250 CHEM CAUT OF GRANLTJ TISSUE: CPT | Mod: 58

## 2025-05-27 ENCOUNTER — NON-APPOINTMENT (OUTPATIENT)
Age: 45
End: 2025-05-27

## 2025-06-20 ENCOUNTER — APPOINTMENT (OUTPATIENT)
Dept: PLASTIC SURGERY | Facility: CLINIC | Age: 45
End: 2025-06-20
Payer: COMMERCIAL

## 2025-06-20 VITALS
HEART RATE: 77 BPM | TEMPERATURE: 97.9 F | DIASTOLIC BLOOD PRESSURE: 88 MMHG | SYSTOLIC BLOOD PRESSURE: 131 MMHG | OXYGEN SATURATION: 96 % | HEIGHT: 70 IN | WEIGHT: 215 LBS | BODY MASS INDEX: 30.78 KG/M2

## 2025-06-20 PROCEDURE — 99024 POSTOP FOLLOW-UP VISIT: CPT

## 2025-08-08 ENCOUNTER — APPOINTMENT (OUTPATIENT)
Dept: PLASTIC SURGERY | Facility: CLINIC | Age: 45
End: 2025-08-08
Payer: COMMERCIAL

## 2025-08-08 VITALS
BODY MASS INDEX: 29.78 KG/M2 | WEIGHT: 208 LBS | OXYGEN SATURATION: 98 % | DIASTOLIC BLOOD PRESSURE: 86 MMHG | SYSTOLIC BLOOD PRESSURE: 133 MMHG | HEART RATE: 79 BPM | TEMPERATURE: 98 F | RESPIRATION RATE: 16 BRPM | HEIGHT: 70 IN

## 2025-08-08 PROCEDURE — 99214 OFFICE O/P EST MOD 30 MIN: CPT | Mod: 57

## 2025-08-08 PROCEDURE — 11900 INJECT SKIN LESIONS </W 7: CPT

## 2025-09-10 ENCOUNTER — APPOINTMENT (OUTPATIENT)
Dept: PLASTIC SURGERY | Facility: CLINIC | Age: 45
End: 2025-09-10
Payer: COMMERCIAL

## 2025-09-10 VITALS
BODY MASS INDEX: 29.2 KG/M2 | WEIGHT: 204 LBS | TEMPERATURE: 97.9 F | HEART RATE: 72 BPM | OXYGEN SATURATION: 97 % | HEIGHT: 70 IN | SYSTOLIC BLOOD PRESSURE: 123 MMHG | RESPIRATION RATE: 16 BRPM | DIASTOLIC BLOOD PRESSURE: 87 MMHG

## 2025-09-10 PROCEDURE — 99214 OFFICE O/P EST MOD 30 MIN: CPT

## (undated) DEVICE — SUT MAXON 1 60" T-60

## (undated) DEVICE — ACCESSORY AVETA WASTE MANAGEMENT 3MM

## (undated) DEVICE — POSITIONER FOAM EGG CRATE ULNAR 2PCS (PINK)

## (undated) DEVICE — ELCTR REM POLYHESIVE ADULT PT RETURN 15FT

## (undated) DEVICE — TUBING LIPOSUCTION HI-VAC PSI-TEC

## (undated) DEVICE — SYR ASEPTO

## (undated) DEVICE — DRAPE LIGHT HANDLE COVER (GREEN)

## (undated) DEVICE — DRSG TEGADERM 4 X 4.75"

## (undated) DEVICE — SUT MONOCRYL 4-0 27" PS-2 UNDYED

## (undated) DEVICE — MEDICINE CUP WITH LID 60ML

## (undated) DEVICE — SUCTION YANKAUER NO CONTROL VENT

## (undated) DEVICE — VENODYNE/SCD SLEEVE CALF MEDIUM

## (undated) DEVICE — DRAPE WARMING SOLUTION 44 X 44"

## (undated) DEVICE — PACK LITHOTOMY

## (undated) DEVICE — STAPLER SKIN VISI-STAT 35 WIDE

## (undated) DEVICE — SOL IRR POUR NS 0.9% 500ML

## (undated) DEVICE — GLV 7.5 PROTEXIS (BLUE)

## (undated) DEVICE — TUBING LIPOSUCTION

## (undated) DEVICE — AVETA FLUID MANAGEMENT ACCESSORY

## (undated) DEVICE — DRAPE INSTRUMENT POUCH 6.75" X 11"

## (undated) DEVICE — TUBING TUR 2 PRONG

## (undated) DEVICE — Device

## (undated) DEVICE — GLV 8 PROTEXIS (WHITE)

## (undated) DEVICE — GOWN LG

## (undated) DEVICE — SOL IRR BAG NS 0.9% 3000ML

## (undated) DEVICE — ELCTR BOVIE PENCIL SMOKE EVACUATION

## (undated) DEVICE — GOWN TRIMAX LG

## (undated) DEVICE — ABDOMINAL BINDER MED/LG 9" X 36"-64"

## (undated) DEVICE — DRSG CURITY GAUZE SPONGE 4 X 4" 16-PLY

## (undated) DEVICE — SUT QUILL MONODERM 3-0 30CM PS-2

## (undated) DEVICE — LAP PAD W RING 18 X 18"

## (undated) DEVICE — SOL IRR BAG H2O 1000ML

## (undated) DEVICE — DRAIN BLAKE 15FR BARD CHANNEL

## (undated) DEVICE — LABELS BLANK W PEN

## (undated) DEVICE — DRAPE MAGNETIC INSTRUMENT MEDIUM

## (undated) DEVICE — DRSG KERLIX ROLL LG 4.5"

## (undated) DEVICE — ELCTR ROCKER SWITCH PENCIL BLUE 10FT

## (undated) DEVICE — DRAPE 3/4 SHEET 52X76"

## (undated) DEVICE — AVETA CORAL HYSTEROSCOPE 4.6MM DISP

## (undated) DEVICE — LIGASURE IMPACT

## (undated) DEVICE — BASIN SET SINGLE

## (undated) DEVICE — WARMING BLANKET UPPER ADULT

## (undated) DEVICE — DRSG DERMABOND PRINEO 42CM

## (undated) DEVICE — SUT VICRYL 1 18" TIES UNDYED

## (undated) DEVICE — ELCTR BOVIE TIP BLADE INSULATED 6.5" EDGE

## (undated) DEVICE — TUBING INFILTRATION

## (undated) DEVICE — NSA-VIDEO TOWER - STRYKER: Type: DURABLE MEDICAL EQUIPMENT

## (undated) DEVICE — DRSG BIOPATCH DISK W CHG 1" W 4.0MM HOLE

## (undated) DEVICE — DRSG XEROFORM 5 X 9"

## (undated) DEVICE — DRAIN RESERVOIR FOR JACKSON PRATT 100CC CARDINAL

## (undated) DEVICE — GLV 7 PROTEXIS (WHITE)

## (undated) DEVICE — DRAPE TOWEL BLUE 17" X 24"

## (undated) DEVICE — SUT VICRYL 2-0 27" SH UNDYED

## (undated) DEVICE — FOLEY TRAY 16FR 5CC LF UMETER CLOSED

## (undated) DEVICE — PACK MAJOR ABDOMINAL WITH LAP

## (undated) DEVICE — BLADE SURGICAL #10 CARBON

## (undated) DEVICE — PREP TRAY DRY SKIN PREP SCRUB

## (undated) DEVICE — CURETTE ENDOMETRIAL GYNOSAMPLER 23.6CM

## (undated) DEVICE — SUT PDS II 0 36" CT-1

## (undated) DEVICE — SUT VICRYL 1 36" CT-1 UNDYED

## (undated) DEVICE — SUT VICRYL 2-0 27" CT-2 UNDYED

## (undated) DEVICE — DRAPE 1/2 SHEET 40X57"

## (undated) DEVICE — DRSG DERMABOND PRINEO 22CM

## (undated) DEVICE — PREP BETADINE KIT

## (undated) DEVICE — ELCTR GROUNDING PAD ADULT COVIDIEN

## (undated) DEVICE — ELCTR BOVIE TIP BLADE INSULATED 2.75" EDGE

## (undated) DEVICE — WARMING BLANKET FULL UNDERBODY

## (undated) DEVICE — SYR LUER LOK 20CC

## (undated) DEVICE — DRSG TELFA 3 X 8

## (undated) DEVICE — SUT MONOCRYL 3-0 27" PS-2 UNDYED

## (undated) DEVICE — OS FINDER

## (undated) DEVICE — ABDOMINAL BINDER MED/LG 12" X 36"-54"

## (undated) DEVICE — MARKING PEN W RULER

## (undated) DEVICE — DRAPE SPLIT SHEET 77" X 120"

## (undated) DEVICE — POSITIONER STRAP ARMBOARD VELCRO TS-30